# Patient Record
Sex: FEMALE | Race: OTHER | HISPANIC OR LATINO
[De-identification: names, ages, dates, MRNs, and addresses within clinical notes are randomized per-mention and may not be internally consistent; named-entity substitution may affect disease eponyms.]

---

## 2017-03-27 ENCOUNTER — APPOINTMENT (OUTPATIENT)
Dept: INTERNAL MEDICINE | Facility: CLINIC | Age: 42
End: 2017-03-27

## 2017-04-03 ENCOUNTER — OUTPATIENT (OUTPATIENT)
Dept: OUTPATIENT SERVICES | Facility: HOSPITAL | Age: 42
LOS: 1 days | End: 2017-04-03
Payer: SELF-PAY

## 2017-04-03 ENCOUNTER — APPOINTMENT (OUTPATIENT)
Dept: INTERNAL MEDICINE | Facility: CLINIC | Age: 42
End: 2017-04-03

## 2017-04-03 VITALS
HEART RATE: 78 BPM | WEIGHT: 137 LBS | BODY MASS INDEX: 25.89 KG/M2 | SYSTOLIC BLOOD PRESSURE: 122 MMHG | DIASTOLIC BLOOD PRESSURE: 80 MMHG

## 2017-04-03 DIAGNOSIS — M54.12 RADICULOPATHY, CERVICAL REGION: ICD-10-CM

## 2017-04-03 DIAGNOSIS — I10 ESSENTIAL (PRIMARY) HYPERTENSION: ICD-10-CM

## 2017-04-03 DIAGNOSIS — G47.9 SLEEP DISORDER, UNSPECIFIED: ICD-10-CM

## 2017-04-11 DIAGNOSIS — M54.12 RADICULOPATHY, CERVICAL REGION: ICD-10-CM

## 2017-04-11 DIAGNOSIS — E66.3 OVERWEIGHT: ICD-10-CM

## 2017-04-25 ENCOUNTER — LABORATORY RESULT (OUTPATIENT)
Age: 42
End: 2017-04-25

## 2017-04-25 PROCEDURE — G0463: CPT

## 2017-04-25 PROCEDURE — 84443 ASSAY THYROID STIM HORMONE: CPT

## 2017-04-26 LAB — TSH SERPL-MCNC: 3.04 UIU/ML — SIGNIFICANT CHANGE UP (ref 0.27–4.2)

## 2018-01-09 ENCOUNTER — MESSAGE (OUTPATIENT)
Age: 43
End: 2018-01-09

## 2018-01-18 ENCOUNTER — OUTPATIENT (OUTPATIENT)
Dept: OUTPATIENT SERVICES | Facility: HOSPITAL | Age: 43
LOS: 1 days | End: 2018-01-18
Payer: SELF-PAY

## 2018-01-18 ENCOUNTER — LABORATORY RESULT (OUTPATIENT)
Age: 43
End: 2018-01-18

## 2018-01-18 ENCOUNTER — APPOINTMENT (OUTPATIENT)
Dept: INTERNAL MEDICINE | Facility: CLINIC | Age: 43
End: 2018-01-18

## 2018-01-18 VITALS
SYSTOLIC BLOOD PRESSURE: 118 MMHG | OXYGEN SATURATION: 98 % | BODY MASS INDEX: 26.06 KG/M2 | HEIGHT: 61 IN | WEIGHT: 138 LBS | HEART RATE: 68 BPM | DIASTOLIC BLOOD PRESSURE: 72 MMHG

## 2018-01-18 DIAGNOSIS — M54.12 RADICULOPATHY, CERVICAL REGION: ICD-10-CM

## 2018-01-18 DIAGNOSIS — N63.0 UNSPECIFIED LUMP IN UNSPECIFIED BREAST: ICD-10-CM

## 2018-01-18 DIAGNOSIS — R07.9 CHEST PAIN, UNSPECIFIED: ICD-10-CM

## 2018-01-18 DIAGNOSIS — I10 ESSENTIAL (PRIMARY) HYPERTENSION: ICD-10-CM

## 2018-01-18 DIAGNOSIS — Z01.419 ENCOUNTER FOR GYNECOLOGICAL EXAMINATION (GENERAL) (ROUTINE) WITHOUT ABNORMAL FINDINGS: ICD-10-CM

## 2018-01-18 LAB
HCT VFR BLD CALC: 43.3 % — SIGNIFICANT CHANGE UP (ref 34.5–45)
HGB BLD-MCNC: 14.2 G/DL — SIGNIFICANT CHANGE UP (ref 11.5–15.5)
MCHC RBC-ENTMCNC: 30.1 PG — SIGNIFICANT CHANGE UP (ref 27–34)
MCHC RBC-ENTMCNC: 32.8 GM/DL — SIGNIFICANT CHANGE UP (ref 32–36)
MCV RBC AUTO: 91.7 FL — SIGNIFICANT CHANGE UP (ref 80–100)
PLATELET # BLD AUTO: 384 K/UL — SIGNIFICANT CHANGE UP (ref 150–400)
RBC # BLD: 4.72 M/UL — SIGNIFICANT CHANGE UP (ref 3.8–5.2)
RBC # FLD: 14.7 % — HIGH (ref 10.3–14.5)
WBC # BLD: 9.23 K/UL — SIGNIFICANT CHANGE UP (ref 3.8–10.5)
WBC # FLD AUTO: 9.23 K/UL — SIGNIFICANT CHANGE UP (ref 3.8–10.5)

## 2018-01-18 PROCEDURE — 82306 VITAMIN D 25 HYDROXY: CPT

## 2018-01-18 PROCEDURE — 93005 ELECTROCARDIOGRAM TRACING: CPT

## 2018-01-18 PROCEDURE — 90688 IIV4 VACCINE SPLT 0.5 ML IM: CPT

## 2018-01-18 PROCEDURE — 83036 HEMOGLOBIN GLYCOSYLATED A1C: CPT

## 2018-01-18 PROCEDURE — 84443 ASSAY THYROID STIM HORMONE: CPT

## 2018-01-18 PROCEDURE — G0008: CPT

## 2018-01-18 PROCEDURE — 80053 COMPREHEN METABOLIC PANEL: CPT

## 2018-01-18 PROCEDURE — 85027 COMPLETE CBC AUTOMATED: CPT

## 2018-01-18 PROCEDURE — G0463: CPT

## 2018-01-18 PROCEDURE — 80061 LIPID PANEL: CPT

## 2018-01-19 ENCOUNTER — MED ADMIN CHARGE (OUTPATIENT)
Age: 43
End: 2018-01-19

## 2018-01-19 DIAGNOSIS — E08.29 DIABETES MELLITUS DUE TO UNDERLYING CONDITION WITH OTHER DIABETIC KIDNEY COMPLICATION: ICD-10-CM

## 2018-01-19 DIAGNOSIS — E10.29 TYPE 1 DIABETES MELLITUS WITH OTHER DIABETIC KIDNEY COMPLICATION: ICD-10-CM

## 2018-01-19 DIAGNOSIS — Z79.4 DIABETES MELLITUS DUE TO UNDERLYING CONDITION WITH OTHER DIABETIC KIDNEY COMPLICATION: ICD-10-CM

## 2018-01-19 DIAGNOSIS — E10.628 TYPE 1 DIABETES MELLITUS WITH OTHER SKIN COMPLICATIONS: ICD-10-CM

## 2018-01-19 DIAGNOSIS — R80.9 DIABETES MELLITUS DUE TO UNDERLYING CONDITION WITH OTHER DIABETIC KIDNEY COMPLICATION: ICD-10-CM

## 2018-01-19 LAB
24R-OH-CALCIDIOL SERPL-MCNC: 19 NG/ML — LOW (ref 30–80)
ALBUMIN SERPL ELPH-MCNC: 4.4 G/DL — SIGNIFICANT CHANGE UP (ref 3.3–5)
ALP SERPL-CCNC: 107 U/L — SIGNIFICANT CHANGE UP (ref 40–120)
ALT FLD-CCNC: 99 U/L — HIGH (ref 10–45)
ANION GAP SERPL CALC-SCNC: 16 MMOL/L — SIGNIFICANT CHANGE UP (ref 5–17)
AST SERPL-CCNC: 60 U/L — HIGH (ref 10–40)
BILIRUB SERPL-MCNC: 0.3 MG/DL — SIGNIFICANT CHANGE UP (ref 0.2–1.2)
BUN SERPL-MCNC: 8 MG/DL — SIGNIFICANT CHANGE UP (ref 7–23)
CALCIUM SERPL-MCNC: 9.3 MG/DL — SIGNIFICANT CHANGE UP (ref 8.4–10.5)
CHLORIDE SERPL-SCNC: 100 MMOL/L — SIGNIFICANT CHANGE UP (ref 96–108)
CHOLEST SERPL-MCNC: 165 MG/DL — SIGNIFICANT CHANGE UP (ref 10–199)
CO2 SERPL-SCNC: 24 MMOL/L — SIGNIFICANT CHANGE UP (ref 22–31)
CREAT SERPL-MCNC: 0.52 MG/DL — SIGNIFICANT CHANGE UP (ref 0.5–1.3)
GLUCOSE SERPL-MCNC: 102 MG/DL — HIGH (ref 70–99)
HBA1C BLD-MCNC: 7 % — HIGH (ref 4–5.6)
HDLC SERPL-MCNC: 36 MG/DL — LOW (ref 40–125)
LIPID PNL WITH DIRECT LDL SERPL: 98 MG/DL — SIGNIFICANT CHANGE UP
POTASSIUM SERPL-MCNC: 4.2 MMOL/L — SIGNIFICANT CHANGE UP (ref 3.5–5.3)
POTASSIUM SERPL-SCNC: 4.2 MMOL/L — SIGNIFICANT CHANGE UP (ref 3.5–5.3)
PROT SERPL-MCNC: 7.8 G/DL — SIGNIFICANT CHANGE UP (ref 6–8.3)
SODIUM SERPL-SCNC: 140 MMOL/L — SIGNIFICANT CHANGE UP (ref 135–145)
TOTAL CHOLESTEROL/HDL RATIO MEASUREMENT: 4.6 RATIO — SIGNIFICANT CHANGE UP (ref 3.3–7.1)
TRIGL SERPL-MCNC: 153 MG/DL — HIGH (ref 10–149)
TSH SERPL-MCNC: 4.86 UIU/ML — HIGH (ref 0.27–4.2)

## 2018-01-24 DIAGNOSIS — Z23 ENCOUNTER FOR IMMUNIZATION: ICD-10-CM

## 2018-01-24 DIAGNOSIS — N63.0 UNSPECIFIED LUMP IN UNSPECIFIED BREAST: ICD-10-CM

## 2018-01-24 DIAGNOSIS — R07.9 CHEST PAIN, UNSPECIFIED: ICD-10-CM

## 2018-01-24 DIAGNOSIS — F32.9 MAJOR DEPRESSIVE DISORDER, SINGLE EPISODE, UNSPECIFIED: ICD-10-CM

## 2018-01-29 ENCOUNTER — FORM ENCOUNTER (OUTPATIENT)
Age: 43
End: 2018-01-29

## 2018-01-30 ENCOUNTER — APPOINTMENT (OUTPATIENT)
Dept: MAMMOGRAPHY | Facility: IMAGING CENTER | Age: 43
End: 2018-01-30
Payer: COMMERCIAL

## 2018-01-30 ENCOUNTER — LABORATORY RESULT (OUTPATIENT)
Age: 43
End: 2018-01-30

## 2018-01-30 ENCOUNTER — OUTPATIENT (OUTPATIENT)
Dept: OUTPATIENT SERVICES | Facility: HOSPITAL | Age: 43
LOS: 1 days | End: 2018-01-30
Payer: SELF-PAY

## 2018-01-30 ENCOUNTER — APPOINTMENT (OUTPATIENT)
Dept: ULTRASOUND IMAGING | Facility: IMAGING CENTER | Age: 43
End: 2018-01-30
Payer: COMMERCIAL

## 2018-01-30 DIAGNOSIS — Z23 ENCOUNTER FOR IMMUNIZATION: ICD-10-CM

## 2018-01-30 DIAGNOSIS — F32.9 MAJOR DEPRESSIVE DISORDER, SINGLE EPISODE, UNSPECIFIED: ICD-10-CM

## 2018-01-30 DIAGNOSIS — N63.0 UNSPECIFIED LUMP IN UNSPECIFIED BREAST: ICD-10-CM

## 2018-01-30 DIAGNOSIS — R07.9 CHEST PAIN, UNSPECIFIED: ICD-10-CM

## 2018-01-30 PROCEDURE — 77067 SCR MAMMO BI INCL CAD: CPT | Mod: 26

## 2018-01-30 PROCEDURE — 77067 SCR MAMMO BI INCL CAD: CPT

## 2018-01-30 PROCEDURE — 77063 BREAST TOMOSYNTHESIS BI: CPT | Mod: 26

## 2018-01-30 PROCEDURE — 77063 BREAST TOMOSYNTHESIS BI: CPT

## 2018-01-31 ENCOUNTER — OUTPATIENT (OUTPATIENT)
Dept: OUTPATIENT SERVICES | Facility: HOSPITAL | Age: 43
LOS: 1 days | End: 2018-01-31
Payer: SELF-PAY

## 2018-01-31 ENCOUNTER — APPOINTMENT (OUTPATIENT)
Dept: OBGYN | Facility: CLINIC | Age: 43
End: 2018-01-31
Payer: COMMERCIAL

## 2018-01-31 ENCOUNTER — LABORATORY RESULT (OUTPATIENT)
Age: 43
End: 2018-01-31

## 2018-01-31 VITALS
WEIGHT: 139.25 LBS | BODY MASS INDEX: 26.29 KG/M2 | HEIGHT: 61 IN | DIASTOLIC BLOOD PRESSURE: 100 MMHG | SYSTOLIC BLOOD PRESSURE: 148 MMHG

## 2018-01-31 DIAGNOSIS — N76.0 ACUTE VAGINITIS: ICD-10-CM

## 2018-01-31 DIAGNOSIS — Z01.419 ENCOUNTER FOR GYNECOLOGICAL EXAMINATION (GENERAL) (ROUTINE) W/OUT ABNORMAL FINDINGS: ICD-10-CM

## 2018-01-31 PROCEDURE — 99396 PREV VISIT EST AGE 40-64: CPT | Mod: NC

## 2018-02-01 LAB
C TRACH RRNA SPEC QL NAA+PROBE: SIGNIFICANT CHANGE UP
HCV AB S/CO SERPL IA: 0.06 S/CO — SIGNIFICANT CHANGE UP
HCV AB SERPL-IMP: SIGNIFICANT CHANGE UP
HIV 1+2 AB+HIV1 P24 AG SERPL QL IA: SIGNIFICANT CHANGE UP
HPV HIGH+LOW RISK DNA PNL CVX: SIGNIFICANT CHANGE UP
N GONORRHOEA RRNA SPEC QL NAA+PROBE: SIGNIFICANT CHANGE UP
SPECIMEN SOURCE: SIGNIFICANT CHANGE UP
T PALLIDUM AB TITR SER: NEGATIVE — SIGNIFICANT CHANGE UP

## 2018-02-01 PROCEDURE — 86803 HEPATITIS C AB TEST: CPT

## 2018-02-01 PROCEDURE — 88175 CYTOPATH C/V AUTO FLUID REDO: CPT

## 2018-02-01 PROCEDURE — G0463: CPT

## 2018-02-01 PROCEDURE — 86780 TREPONEMA PALLIDUM: CPT

## 2018-02-01 PROCEDURE — 87624 HPV HI-RISK TYP POOLED RSLT: CPT

## 2018-02-01 PROCEDURE — 87389 HIV-1 AG W/HIV-1&-2 AB AG IA: CPT

## 2018-02-03 LAB — CYTOLOGY SPEC DOC CYTO: SIGNIFICANT CHANGE UP

## 2018-02-08 DIAGNOSIS — Z01.419 ENCOUNTER FOR GYNECOLOGICAL EXAMINATION (GENERAL) (ROUTINE) WITHOUT ABNORMAL FINDINGS: ICD-10-CM

## 2018-05-25 ENCOUNTER — OTHER (OUTPATIENT)
Age: 43
End: 2018-05-25

## 2018-05-25 ENCOUNTER — EMERGENCY (EMERGENCY)
Facility: HOSPITAL | Age: 43
LOS: 1 days | Discharge: HOME | End: 2018-05-25
Attending: EMERGENCY MEDICINE
Payer: MEDICAID

## 2018-05-25 VITALS
DIASTOLIC BLOOD PRESSURE: 59 MMHG | HEART RATE: 76 BPM | TEMPERATURE: 98 F | HEIGHT: 59.06 IN | SYSTOLIC BLOOD PRESSURE: 103 MMHG | WEIGHT: 138.01 LBS | RESPIRATION RATE: 18 BRPM | OXYGEN SATURATION: 98 %

## 2018-05-25 PROCEDURE — 99283 EMERGENCY DEPT VISIT LOW MDM: CPT | Mod: 25

## 2018-05-25 PROCEDURE — 10060 I&D ABSCESS SIMPLE/SINGLE: CPT

## 2018-05-25 RX ORDER — AZTREONAM 2 G
2 VIAL (EA) INJECTION
Qty: 40 | Refills: 0 | OUTPATIENT
Start: 2018-05-25 | End: 2018-06-03

## 2018-05-25 RX ADMIN — Medication 2 TABLET(S): at 15:09

## 2018-05-25 NOTE — ED PROVIDER NOTE - ATTENDING CONTRIBUTION TO CARE
Abscess to L calf x8 days, unroofed with some drainage at home but still having pain, on exam patient with abscess to L medial calf with palpable underlying fluctuance and surrounding cellulitis - I&D, antibiotics, f/u in Emergency Department 2 days for wound check.

## 2018-05-25 NOTE — ED PROVIDER NOTE - CROS ED CARDIOVAS ALL NEG
----- Message from Vee Rose sent at 3/2/2017  1:36 PM CST -----  Regarding: Relpax refill  Contact: [phone]  Hi Dr. Casas,    I tried to fill my prescription at [facility] with the discount card, however, it was still too much money.  Could you please send refill to [facility].      Thanks!  Have a good day!  Vee Rose  
[facility] to [facility]. Patient notified via [employer]  
negative...

## 2018-05-25 NOTE — ED PROVIDER NOTE - OBJECTIVE STATEMENT
41yo female p/w right lower extremity abscess for 8 days. Pt. reports pain to the site, seen at outside clinic and referred to ED. Denies fevers, weakness, trauma.

## 2018-05-25 NOTE — ED PROVIDER NOTE - SKIN WOUND DESCRIPTION
right anterior lower extremity(overlying mid tibia) 2x2cm fluctulance, mild serous drainage; right 2nd digit peronychia, normal strngth and sensation and range of motion

## 2018-05-25 NOTE — ED PROVIDER NOTE - CARE PLAN
Principal Discharge DX:	Abscess  Assessment and plan of treatment:	You were seen in the Emergency Department for an abscess. It was drained. Please follow up with your regular physician this week for reevaluation. Take the antibiotics as written. Please return to the Emergency Department if you have any new concerning symptoms such as severe pain, weakness, fevers or any other concerning symptoms.

## 2018-05-25 NOTE — ED PROVIDER NOTE - PLAN OF CARE
You were seen in the Emergency Department for an abscess. It was drained. Please follow up with your regular physician this week for reevaluation. Take the antibiotics as written. Please return to the Emergency Department if you have any new concerning symptoms such as severe pain, weakness, fevers or any other concerning symptoms.

## 2018-05-29 ENCOUNTER — OUTPATIENT (OUTPATIENT)
Dept: OUTPATIENT SERVICES | Facility: HOSPITAL | Age: 43
LOS: 1 days | End: 2018-05-29
Payer: SELF-PAY

## 2018-05-29 ENCOUNTER — APPOINTMENT (OUTPATIENT)
Dept: INTERNAL MEDICINE | Facility: CLINIC | Age: 43
End: 2018-05-29

## 2018-05-29 VITALS
HEIGHT: 61 IN | SYSTOLIC BLOOD PRESSURE: 102 MMHG | WEIGHT: 135 LBS | OXYGEN SATURATION: 98 % | DIASTOLIC BLOOD PRESSURE: 60 MMHG | BODY MASS INDEX: 25.49 KG/M2 | HEART RATE: 80 BPM | TEMPERATURE: 98.6 F

## 2018-05-29 VITALS — SYSTOLIC BLOOD PRESSURE: 110 MMHG | DIASTOLIC BLOOD PRESSURE: 70 MMHG

## 2018-05-29 DIAGNOSIS — R74.8 ABNORMAL LEVELS OF OTHER SERUM ENZYMES: ICD-10-CM

## 2018-05-29 DIAGNOSIS — I10 ESSENTIAL (PRIMARY) HYPERTENSION: ICD-10-CM

## 2018-05-29 DIAGNOSIS — L02.419 CUTANEOUS ABSCESS OF LIMB, UNSPECIFIED: ICD-10-CM

## 2018-05-29 PROCEDURE — G0463: CPT

## 2018-05-30 LAB
ALBUMIN SERPL ELPH-MCNC: 4.5 G/DL
ALP BLD-CCNC: 104 U/L
ALT SERPL-CCNC: 40 U/L
ANION GAP SERPL CALC-SCNC: 15 MMOL/L
AST SERPL-CCNC: 29 U/L
BASOPHILS # BLD AUTO: 0.04 K/UL
BASOPHILS NFR BLD AUTO: 0.5 %
BILIRUB SERPL-MCNC: 0.2 MG/DL
BUN SERPL-MCNC: 8 MG/DL
CALCIUM SERPL-MCNC: 9.6 MG/DL
CHLORIDE SERPL-SCNC: 102 MMOL/L
CO2 SERPL-SCNC: 23 MMOL/L
CREAT SERPL-MCNC: 0.58 MG/DL
EOSINOPHIL # BLD AUTO: 0.12 K/UL
EOSINOPHIL NFR BLD AUTO: 1.4 %
GLUCOSE SERPL-MCNC: 87 MG/DL
HBA1C MFR BLD HPLC: 7.1 %
HCT VFR BLD CALC: 43.3 %
HGB BLD-MCNC: 14.3 G/DL
IMM GRANULOCYTES NFR BLD AUTO: 0.5 %
LYMPHOCYTES # BLD AUTO: 2.45 K/UL
LYMPHOCYTES NFR BLD AUTO: 28.9 %
MAN DIFF?: NORMAL
MCHC RBC-ENTMCNC: 28.9 PG
MCHC RBC-ENTMCNC: 33 GM/DL
MCV RBC AUTO: 87.7 FL
MONOCYTES # BLD AUTO: 0.59 K/UL
MONOCYTES NFR BLD AUTO: 7 %
NEUTROPHILS # BLD AUTO: 5.24 K/UL
NEUTROPHILS NFR BLD AUTO: 61.7 %
PLATELET # BLD AUTO: 377 K/UL
POTASSIUM SERPL-SCNC: 4.4 MMOL/L
PROT SERPL-MCNC: 8 G/DL
RBC # BLD: 4.94 M/UL
RBC # FLD: 14.2 %
SODIUM SERPL-SCNC: 140 MMOL/L
TSH SERPL-ACNC: 3.92 UIU/ML
WBC # FLD AUTO: 8.48 K/UL

## 2018-05-30 NOTE — PLAN
[FreeTextEntry1] : #R Leg Abscess s/p I&D, clinically non-toxic appearing and hypotension possibly component of dehydration or baseline low BP rather than sepsis\par -Given Bactrim DS BID for 10 days (5/25-6/3) during a recent [facility] ED visit (5/25)\par -c/w Bactrim and f/u Cr but currently no c/o SE\par -advised to go back to the ED if she notices her leg getting worse with redness, starts having fever, notices having dizziness, CP, HA\par \par #DM, A1c 7.0% at 1/2018, currently asymptomatic and stable\par -c/w Metformin 500mg BID\par -f/u A1c\par \par #Hypothyroidism, currently asymptomatic and stable\par -c/w Levothyroxine 88mcg qd\par -f/u TSH w/ FT4\par \par #Transaminitis 2/2 hepatic steatosis seen on 5/2015\par -f/u LFTs\par -considering repeat RUQ US\par \par #HCM\par -c/w VIt D 2000U qd\par -UTD on pap and cultures, wnl as per annual GYN 1/2018\par \par Case d/w Dr. Steward\par \par RTC in 1 year for annual CPE or earlier if worsening R leg abscess

## 2018-05-30 NOTE — HISTORY OF PRESENT ILLNESS
[FreeTextEntry1] : Post ED visit after having an abscess on the leg drained and treated with antibiotics.  [de-identified] : 43 y/o F PMH DM, Hypothyroidism, GERD, hx of benign breast mass, depression presents for f/u visit after going to the ED on 5/25 for R leg abscess\par \par #R Leg abscess, started as a pimple 2 mons and then got worse 2 weeks ago, started to enlarge and a/w chills/malaise and boil popped, advised to go to the [facility] ED 5/25. Denies fever, trauma, insect bites weakness\par I&D of 2x2 cm erythematous fluctuant R LE abscess and given Bactrim DS BID for 10 days (5/25-6/3) and had started to get better and smaller and it is now not painful and hasn't had any issues taking the medications. No fevers, nausea, vomiting, diarrhea, sick contact\par  \par #DM, A1c 7/0% at 1/2018, still able to take the metformin 500mg BID and w/ no issues of polyuria, polydipsia, nocturia\par \par #Hypothyroidism, pt has been on Synthroid 88mcg .Denied  any recent weight loss, weight gain, cold/heat intolerance, dry skin, hair loss.

## 2018-05-30 NOTE — REVIEW OF SYSTEMS
[Skin Lesions] : skin lesion [Skin Wound] : skin wound [Fever] : no fever [Chills] : no chills [Feeling Poorly] : not feeling poorly [Feeling Tired] : not feeling tired [Recent Weight Gain (___ Lbs)] : no recent weight gain [Recent Weight Loss (___ Lbs)] : no recent weight loss [Heart Rate Is Slow] : the heart rate was not slow [Heart Rate Is Fast] : the heart rate was not fast [Chest Pain] : no chest pain [Shortness Of Breath] : no shortness of breath [Cough] : no cough [Abdominal Pain] : no abdominal pain [Constipation] : no constipation [Diarrhea] : no diarrhea [Dysuria] : no dysuria [Confused] : no confusion [Dizziness] : no dizziness [Anxiety] : no anxiety [Depression] : no depression

## 2018-05-30 NOTE — PHYSICAL EXAM
[General Appearance - Alert] : alert [General Appearance - In No Acute Distress] : in no acute distress [General Appearance - Well Nourished] : well nourished [General Appearance - Well Developed] : well developed [General Appearance - Well-Appearing] : healthy appearing [Sclera] : the sclera and conjunctiva were normal [PERRL With Normal Accommodation] : pupils were equal in size, round, and reactive to light [Extraocular Movements] : extraocular movements were intact [Outer Ear] : the ears and nose were normal in appearance [Hearing Threshold Finger Rub Not [place]] : hearing was normal [Examination Of The Oral Cavity] : the lips and gums were normal [Nasal Cavity] : the nasal mucosa and septum were normal [Oropharynx] : the oropharynx was normal [Neck Appearance] : the appearance of the neck was normal [Neck Cervical Mass (___cm)] : no neck mass was observed [Jugular Venous Distention Increased] : there was no jugular-venous distention [Thyroid Diffuse Enlargement] : the thyroid was not enlarged [Thyroid Nodule] : there were no palpable thyroid nodules [Respiration, Rhythm And Depth] : normal respiratory rhythm and effort [Exaggerated Use Of Accessory Muscles For Inspiration] : no accessory muscle use [Auscultation Breath Sounds / Voice Sounds] : lungs were clear to auscultation bilaterally [Heart Rate And Rhythm] : heart rate was normal and rhythm regular [Heart Sounds] : normal S1 and S2 [Heart Sounds Gallop] : no gallops [Murmurs] : no murmurs [Heart Sounds Pericardial Friction Rub] : no pericardial rub [Arterial Pulses Carotid] : carotid pulses were normal with no bruits [Bowel Sounds] : normal bowel sounds [Abdomen Soft] : soft [Abdomen Tenderness] : non-tender [Abdomen Mass (___ Cm)] : no abdominal mass palpated [Abdomen Hernia] : no hernia was discovered [Cervical Lymph Nodes Enlarged Posterior Bilaterally] : posterior cervical [Cervical Lymph Nodes Enlarged Anterior Bilaterally] : anterior cervical [Abnormal Walk] : normal gait [Nail Clubbing] : no clubbing  or cyanosis of the fingernails [Involuntary Movements] : no involuntary movements were seen [Musculoskeletal - Swelling] : no joint swelling seen [Motor Tone] : muscle strength and tone were normal [Skin Color & Pigmentation] : normal skin color and pigmentation [Skin Turgor] : normal skin turgor [] : no rash [Skin Lesions] : no skin lesions [Cranial Nerves] : cranial nerves 2-12 were intact [Sensation] : the sensory exam was normal to light touch and pinprick [Motor Exam] : the motor exam was normal [No Focal Deficits] : no focal deficits [Oriented To Time, Place, And Person] : oriented to person, place, and time [Impaired Insight] : insight and judgment were intact [FreeTextEntry1] : Non-toxic appearing [Over the Past 2 Weeks, Have You Felt Down, Depressed, or Hopeless?] : 1.) Over the past 2 weeks, have you felt down, depressed, or hopeless? No [Over the Past 2 Weeks, Have You Felt Little Interest or Pleasure Doing Things?] : 2.) Over the past 2 weeks, have you felt little interest or pleasure doing things? No

## 2018-05-30 NOTE — HEALTH RISK ASSESSMENT
[Intercurrent ED visits] : went to ED [No falls in past year] : Patient reported no falls in the past year [0] : 2) Feeling down, depressed, or hopeless: Not at all (0) [] : No [de-identified] : 5/25/18

## 2018-05-30 NOTE — ASSESSMENT
[FreeTextEntry1] : 41 y/o F PMH DM, Hypothyroidism, GERD, hx of benign breast mass, depression presents for f/u visit after going to the ED on 5/25 for R leg abscess

## 2018-05-31 DIAGNOSIS — L02.419 CUTANEOUS ABSCESS OF LIMB, UNSPECIFIED: ICD-10-CM

## 2018-05-31 DIAGNOSIS — E11.49 TYPE 2 DIABETES MELLITUS WITH OTHER DIABETIC NEUROLOGICAL COMPLICATION: ICD-10-CM

## 2018-05-31 DIAGNOSIS — E03.8 OTHER SPECIFIED HYPOTHYROIDISM: ICD-10-CM

## 2018-05-31 DIAGNOSIS — Z79.4 LONG TERM (CURRENT) USE OF INSULIN: ICD-10-CM

## 2018-05-31 DIAGNOSIS — R74.8 ABNORMAL LEVELS OF OTHER SERUM ENZYMES: ICD-10-CM

## 2018-05-31 DIAGNOSIS — E06.3 AUTOIMMUNE THYROIDITIS: ICD-10-CM

## 2018-08-08 NOTE — ED ADULT NURSE NOTE - NS ED NURSE LEVEL OF CONSCIOUSNESS AFFECT
This is a recent snapshot of the patient's [facility] medical record.  For current drug dose and complete information and questions, call [phone] or In Basket pool, [facility] home infusion ([id number])  CSN Number:  [id number]       Calm

## 2019-03-06 ENCOUNTER — EMERGENCY (EMERGENCY)
Facility: HOSPITAL | Age: 44
LOS: 1 days | Discharge: HOME | End: 2019-03-06
Attending: EMERGENCY MEDICINE
Payer: SELF-PAY

## 2019-03-06 VITALS
DIASTOLIC BLOOD PRESSURE: 98 MMHG | HEART RATE: 71 BPM | SYSTOLIC BLOOD PRESSURE: 141 MMHG | OXYGEN SATURATION: 100 % | RESPIRATION RATE: 18 BRPM | TEMPERATURE: 98 F

## 2019-03-06 VITALS
WEIGHT: 134.92 LBS | TEMPERATURE: 98 F | DIASTOLIC BLOOD PRESSURE: 111 MMHG | SYSTOLIC BLOOD PRESSURE: 171 MMHG | RESPIRATION RATE: 16 BRPM | HEIGHT: 59.06 IN | OXYGEN SATURATION: 99 % | HEART RATE: 88 BPM

## 2019-03-06 PROCEDURE — 99284 EMERGENCY DEPT VISIT MOD MDM: CPT

## 2019-03-06 PROCEDURE — 90715 TDAP VACCINE 7 YRS/> IM: CPT

## 2019-03-06 PROCEDURE — 90471 IMMUNIZATION ADMIN: CPT

## 2019-03-06 PROCEDURE — 99284 EMERGENCY DEPT VISIT MOD MDM: CPT | Mod: 25

## 2019-03-06 RX ORDER — TETANUS TOXOID, REDUCED DIPHTHERIA TOXOID AND ACELLULAR PERTUSSIS VACCINE, ADSORBED 5; 2.5; 8; 8; 2.5 [IU]/.5ML; [IU]/.5ML; UG/.5ML; UG/.5ML; UG/.5ML
0.5 SUSPENSION INTRAMUSCULAR ONCE
Qty: 0 | Refills: 0 | Status: COMPLETED | OUTPATIENT
Start: 2019-03-06 | End: 2019-03-06

## 2019-03-06 RX ORDER — IBUPROFEN 200 MG
600 TABLET ORAL ONCE
Qty: 0 | Refills: 0 | Status: COMPLETED | OUTPATIENT
Start: 2019-03-06 | End: 2019-03-06

## 2019-03-06 RX ADMIN — Medication 600 MILLIGRAM(S): at 16:27

## 2019-03-06 RX ADMIN — TETANUS TOXOID, REDUCED DIPHTHERIA TOXOID AND ACELLULAR PERTUSSIS VACCINE, ADSORBED 0.5 MILLILITER(S): 5; 2.5; 8; 8; 2.5 SUSPENSION INTRAMUSCULAR at 16:28

## 2019-03-06 RX ADMIN — Medication 1 TABLET(S): at 16:28

## 2019-03-06 NOTE — ED ADULT NURSE NOTE - NS ED NURSE DC INFO COMPLEXITY
Moderate: Comprehensive teaching/Simple: Patient demonstrates quick and easy understanding/Returned Demonstration/Verbalized Understanding

## 2019-03-06 NOTE — ED PROVIDER NOTE - CLINICAL SUMMARY MEDICAL DECISION MAKING FREE TEXT BOX
s/p dog bite today-  superficial - no risk for fb, tdap and augmentin given- wounds  irriagated s/p dog bite today-  superficial - no risk for fb, tdap and augmentin given- wounds  irriagated  Attending Statement: Agree with the above.  Does not require wound closure.  Irrigation and tdap, ppx abx.  Vaccinated dog.  D/C c strict precautions.  --BMM

## 2019-03-06 NOTE — ED PROVIDER NOTE - OBJECTIVE STATEMENT
44 yo male presents to the ER for evaluation of dog bite to right lower leg and left upper thigh. Pt states "I am a  and their dog Walker  bit me as I was walking up the stairs. The owner of the dog , my boss told me the dog is up to date on his immunizations. I put a bandaid on the wounds and I went home. I am concerned about it getting infected". Tdap status unknown.  Pt reports mild pain to bite sites which are both superficial puncture/abraisons.

## 2019-03-06 NOTE — ED ADULT NURSE NOTE - NSIMPLEMENTINTERV_GEN_ALL_ED
Implemented All Universal Safety Interventions:  [place] to call system. Call bell, personal items and telephone within reach. Instruct patient to call for assistance. Room bathroom lighting operational. Non-slip footwear when patient is off stretcher. Physically safe environment: no spills, clutter or unnecessary equipment. Stretcher in lowest position, wheels locked, appropriate side rails in place.

## 2019-03-06 NOTE — ED ADULT TRIAGE NOTE - CHIEF COMPLAINT QUOTE
was bit by a small dog in left upper leg and right lower leg  she asked owners if dog was vaccinated and owners say yes

## 2019-03-06 NOTE — ED PROVIDER NOTE - CARE PLAN
Principal Discharge DX:	Dog bite, initial encounter Principal Discharge DX:	Dog bite, initial encounter  Goal:	B/L LE, upper

## 2019-03-06 NOTE — ED PROVIDER NOTE - PHYSICAL EXAMINATION
right lower extremity with 3x3 cm dog bite, superficial puncture wound with abrasion, no bleeding to site, with some eecchymosis- tender to palpation  Left upper thigh with 2x3 cm  dog bite also superficial puncture wound with abrasion,. no bleeding to site  with some ecchymosis- tender to palpation

## 2019-03-06 NOTE — ED PROVIDER NOTE - NSFOLLOWUPINSTRUCTIONS_ED_ALL_ED_FT
-- Please use 650mg Tylenol (also called acetaminophen) every 4 hours & 600mg Motrin (also called Advil or ibuprofen) every 6 hours as needed for pain/discomfort/swelling. You can get these without a prescription. Don't use more than 3500mg of Tylenol in any 24-hour period. Make sure your other prescription/over-the-counter medications don't contain any Tylenol so you don't take too much. If you have any stomach discomfort while taking Motrin, you can use TUMS or Pepcid or Zantac (these can all be bought without a prescription).     clean wounds daily- inspect for sign or symptoms such as  fever >101, chills, pus, spreading redness or any other concern  Return to the ER for any of the above

## 2019-03-07 NOTE — DISCUSSION/SUMMARY
[FreeTextEntry1] : Pt visited [facility] ED on 3/6 after being bitten by her employers dog in the R lower leg and L upper thigh.  Pt was discharged on a 5 day course of BID augmentin and advised to take Tylenol or advil for pain.

## 2019-08-07 ENCOUNTER — OUTPATIENT (OUTPATIENT)
Dept: OUTPATIENT SERVICES | Facility: HOSPITAL | Age: 44
LOS: 1 days | End: 2019-08-07
Payer: SELF-PAY

## 2019-08-07 ENCOUNTER — LABORATORY RESULT (OUTPATIENT)
Age: 44
End: 2019-08-07

## 2019-08-07 ENCOUNTER — APPOINTMENT (OUTPATIENT)
Dept: INTERNAL MEDICINE | Facility: CLINIC | Age: 44
End: 2019-08-07

## 2019-08-07 VITALS
HEIGHT: 61 IN | BODY MASS INDEX: 24.55 KG/M2 | DIASTOLIC BLOOD PRESSURE: 70 MMHG | WEIGHT: 130 LBS | SYSTOLIC BLOOD PRESSURE: 112 MMHG

## 2019-08-07 DIAGNOSIS — M25.559 PAIN IN UNSPECIFIED HIP: ICD-10-CM

## 2019-08-07 DIAGNOSIS — I10 ESSENTIAL (PRIMARY) HYPERTENSION: ICD-10-CM

## 2019-08-07 DIAGNOSIS — Z79.4 TYPE 2 DIABETES MELLITUS WITH OTHER DIABETIC NEUROLOGICAL COMPLICATION: ICD-10-CM

## 2019-08-07 DIAGNOSIS — E11.49 TYPE 2 DIABETES MELLITUS WITH OTHER DIABETIC NEUROLOGICAL COMPLICATION: ICD-10-CM

## 2019-08-07 DIAGNOSIS — R51 HEADACHE: ICD-10-CM

## 2019-08-07 LAB
ALBUMIN SERPL ELPH-MCNC: 4.5 G/DL — SIGNIFICANT CHANGE UP (ref 3.3–5)
ALP SERPL-CCNC: 107 U/L — SIGNIFICANT CHANGE UP (ref 40–120)
ALT FLD-CCNC: 150 U/L — HIGH (ref 10–45)
ANION GAP SERPL CALC-SCNC: 12 MMOL/L — SIGNIFICANT CHANGE UP (ref 5–17)
AST SERPL-CCNC: 154 U/L — HIGH (ref 10–40)
BILIRUB SERPL-MCNC: 0.3 MG/DL — SIGNIFICANT CHANGE UP (ref 0.2–1.2)
BUN SERPL-MCNC: 7 MG/DL — SIGNIFICANT CHANGE UP (ref 7–23)
CALCIUM SERPL-MCNC: 9.5 MG/DL — SIGNIFICANT CHANGE UP (ref 8.4–10.5)
CHLORIDE SERPL-SCNC: 104 MMOL/L — SIGNIFICANT CHANGE UP (ref 96–108)
CO2 SERPL-SCNC: 23 MMOL/L — SIGNIFICANT CHANGE UP (ref 22–31)
CREAT SERPL-MCNC: 0.5 MG/DL — SIGNIFICANT CHANGE UP (ref 0.5–1.3)
GLUCOSE SERPL-MCNC: 145 MG/DL — HIGH (ref 70–99)
HCT VFR BLD CALC: 45.3 % — HIGH (ref 34.5–45)
HGB BLD-MCNC: 14.3 G/DL — SIGNIFICANT CHANGE UP (ref 11.5–15.5)
MCHC RBC-ENTMCNC: 29.4 PG — SIGNIFICANT CHANGE UP (ref 27–34)
MCHC RBC-ENTMCNC: 31.6 GM/DL — LOW (ref 32–36)
MCV RBC AUTO: 93.2 FL — SIGNIFICANT CHANGE UP (ref 80–100)
PLATELET # BLD AUTO: 373 K/UL — SIGNIFICANT CHANGE UP (ref 150–400)
POTASSIUM SERPL-MCNC: 4.4 MMOL/L — SIGNIFICANT CHANGE UP (ref 3.5–5.3)
POTASSIUM SERPL-SCNC: 4.4 MMOL/L — SIGNIFICANT CHANGE UP (ref 3.5–5.3)
PROT SERPL-MCNC: 7.5 G/DL — SIGNIFICANT CHANGE UP (ref 6–8.3)
RBC # BLD: 4.86 M/UL — SIGNIFICANT CHANGE UP (ref 3.8–5.2)
RBC # FLD: 15.1 % — HIGH (ref 10.3–14.5)
SODIUM SERPL-SCNC: 139 MMOL/L — SIGNIFICANT CHANGE UP (ref 135–145)
WBC # BLD: 7.31 K/UL — SIGNIFICANT CHANGE UP (ref 3.8–10.5)
WBC # FLD AUTO: 7.31 K/UL — SIGNIFICANT CHANGE UP (ref 3.8–10.5)

## 2019-08-07 PROCEDURE — 83036 HEMOGLOBIN GLYCOSYLATED A1C: CPT

## 2019-08-07 PROCEDURE — 80053 COMPREHEN METABOLIC PANEL: CPT

## 2019-08-07 PROCEDURE — 82043 UR ALBUMIN QUANTITATIVE: CPT

## 2019-08-07 PROCEDURE — 36415 COLL VENOUS BLD VENIPUNCTURE: CPT

## 2019-08-07 PROCEDURE — G0463: CPT

## 2019-08-07 PROCEDURE — 84443 ASSAY THYROID STIM HORMONE: CPT

## 2019-08-07 PROCEDURE — 85027 COMPLETE CBC AUTOMATED: CPT

## 2019-08-07 PROCEDURE — 84439 ASSAY OF FREE THYROXINE: CPT

## 2019-08-07 NOTE — PHYSICAL EXAM
[No Acute Distress] : no acute distress [Normal Sclera/Conjunctiva] : normal sclera/conjunctiva [PERRL] : pupils equal round and reactive to light [Normal Oropharynx] : the oropharynx was normal [No Lymphadenopathy] : no lymphadenopathy [No Accessory Muscle Use] : no accessory muscle use [No Respiratory Distress] : no respiratory distress  [Normal Rate] : normal rate  [Clear to Auscultation] : lungs were clear to auscultation bilaterally [Normal S1, S2] : normal S1 and S2 [Regular Rhythm] : with a regular rhythm [Pedal Pulses Present] : the pedal pulses are present [No Edema] : there was no peripheral edema [Non Tender] : non-tender [Soft] : abdomen soft [No Joint Swelling] : no joint swelling [No Spinal Tenderness] : no spinal tenderness [Coordination Grossly Intact] : coordination grossly intact [No Focal Deficits] : no focal deficits [Normal Gait] : normal gait [de-identified] : negative TMJ, no sinus tenderness [de-identified] : No papilledema noted [de-identified] : tenderness to palpation of the left hip joint and surrounding muscle/bursa [de-identified] : 5/5 in both upper and lower extremity

## 2019-08-07 NOTE — REVIEW OF SYSTEMS
[Fatigue] : fatigue [Vision Problems] : vision problems [Nausea] : nausea [Headache] : headache [Fever] : no fever [Chills] : no chills [Discharge] : no discharge [Pain] : no pain [Earache] : no earache [Hearing Loss] : no hearing loss [Chest Pain] : no chest pain [Palpitations] : no palpitations [Lower Ext Edema] : no lower extremity edema [Orthopnea] : no orthopnea [Shortness Of Breath] : no shortness of breath [Wheezing] : no wheezing [Cough] : no cough [Dyspnea on Exertion] : no dyspnea on exertion [Abdominal Pain] : no abdominal pain [Constipation] : no constipation [Diarrhea] : diarrhea [Vomiting] : no vomiting [Dysuria] : no dysuria [Joint Pain] : no joint pain [Itching] : no itching [Dizziness] : no dizziness [Fainting] : no fainting [Easy Bleeding] : no easy bleeding [FreeTextEntry9] : left hip pain

## 2019-08-07 NOTE — HISTORY OF PRESENT ILLNESS
[FreeTextEntry8] : 43 y/o F PMH DM, Hypothyroidism, GERD, hx of benign breast mass, depression presents for acute visit.  ID: [id number].\par \par 1. Headache: Reports headache x 15 days, intermittent, last about 5-10 minutes initially, now lasts up to 8-12 hours. Associated with sex and orgasm.  exercise (running) or lifting things. Located in the left temporal/parietal region. described the pain as sharp and pulsating. The pain also wakes the pt up at night. No photophobia. Had 1 episode of nausea, no emesis. Took Tylenol without relief. Pain is not associated with particular time of the day. Reports foggy vision and sometimes double visions x2 months.\par \par No new medications/diet changes recently.\par \par Had h/o strong headache x 2 episodes diffusely in 2017. Mom had headache and was dx with diabetes. \par \par 2. DM and Hypothyroidism: prescribed metformin and levothyroxine in the past, did not take the medications in 1 year. Does not check sugar level at home. Reports some fatigue, weakness and generalized not feeling well. Worsening visions/foggy over the past few months. Denies numbness/tingling in extremities.\par \par With regard to hypothyroidism, denies cold intolerance or changes in skin/nail. Noticed some constipation and some heat intolerance. \par \par 3. Hip pain: Reports left sided hip pain radiating down the lateral thigh x 4 days. Pain worsen when attempts to internally rotate the hip. Pain improves with Tylenol. Denies trauma to the area.\par

## 2019-08-07 NOTE — ASSESSMENT
[FreeTextEntry1] : 41 y/o F PMH DM, Hypothyroidism, GERD, hx of benign breast mass, depression presents for acute visit.\par \par Headache: given acuity and nature of the headache, need to r/o for increased intracranial pressure\par - CT scan w/wo con of head ordered\par - Neurology referral given\par \par Left hip pain: likely bursitis\par - c/w Tylenol for now\par - If not improving - PT referral next appointment\par \par HCM:\par - check A1C, urine micro albumin/cr-, CBC, TSH and CMP\par - metformin and levothyroxine renewed to pharmacy on file\par - followup in 5 weeks for DM & headache followup, may need Ophth referral \par \par RTO in 5 weeks for followup

## 2019-08-07 NOTE — PLAN
[FreeTextEntry1] : Pain with cough, valsalva, intercourse. Pain awakens her ALL RED flags for increase ICP

## 2019-08-08 DIAGNOSIS — M25.559 PAIN IN UNSPECIFIED HIP: ICD-10-CM

## 2019-08-08 DIAGNOSIS — E11.49 TYPE 2 DIABETES MELLITUS WITH OTHER DIABETIC NEUROLOGICAL COMPLICATION: ICD-10-CM

## 2019-08-08 DIAGNOSIS — E11.9 TYPE 2 DIABETES MELLITUS WITHOUT COMPLICATIONS: ICD-10-CM

## 2019-08-08 DIAGNOSIS — R51 HEADACHE: ICD-10-CM

## 2019-08-08 DIAGNOSIS — E03.8 OTHER SPECIFIED HYPOTHYROIDISM: ICD-10-CM

## 2019-08-08 LAB
CREAT ?TM UR-MCNC: 44 MG/DL — SIGNIFICANT CHANGE UP
ESTIMATED AVERAGE GLUCOSE: 192 MG/DL — HIGH (ref 68–114)
HBA1C BLD-MCNC: 8.3 % — HIGH (ref 4–5.6)
MICROALBUMIN UR-MCNC: <1.2 MG/DL — SIGNIFICANT CHANGE UP
MICROALBUMIN/CREAT UR-RTO: SIGNIFICANT CHANGE UP MG/G (ref 0–30)
T4 FREE SERPL-MCNC: 0.8 NG/DL — LOW (ref 0.9–1.8)
T4 FREE+ TSH PNL SERPL: 7.75 UIU/ML — HIGH (ref 0.27–4.2)

## 2020-01-14 ENCOUNTER — LABORATORY RESULT (OUTPATIENT)
Age: 45
End: 2020-01-14

## 2020-01-15 ENCOUNTER — OUTPATIENT (OUTPATIENT)
Dept: OUTPATIENT SERVICES | Facility: HOSPITAL | Age: 45
LOS: 1 days | End: 2020-01-15
Payer: SELF-PAY

## 2020-01-15 ENCOUNTER — APPOINTMENT (OUTPATIENT)
Dept: OBGYN | Facility: CLINIC | Age: 45
End: 2020-01-15
Payer: COMMERCIAL

## 2020-01-15 VITALS — BODY MASS INDEX: 25.7 KG/M2 | SYSTOLIC BLOOD PRESSURE: 114 MMHG | DIASTOLIC BLOOD PRESSURE: 70 MMHG | WEIGHT: 136 LBS

## 2020-01-15 DIAGNOSIS — N76.0 ACUTE VAGINITIS: ICD-10-CM

## 2020-01-15 DIAGNOSIS — N90.89 OTHER SPECIFIED NONINFLAMMATORY DISORDERS OF VULVA AND PERINEUM: ICD-10-CM

## 2020-01-15 PROCEDURE — 99214 OFFICE O/P EST MOD 30 MIN: CPT | Mod: NC

## 2020-01-15 PROCEDURE — G0463: CPT

## 2020-01-21 NOTE — END OF VISIT
[] : Resident [FreeTextEntry3] : I was present for procedure Vulvar 4 mm punch biopsy one on the left and one on the right

## 2020-01-21 NOTE — PROCEDURE
[Vulvar Biopsy] : Vulvar Biopsy [Sent to Histology] : the specimen was place in buffered formalin and sent for pathlogy [] : on the left labia minora [Silver Nitrate] : silver nitrate [Tolerated Well] : the patient tolerated the procedure well [Punch] : punch biopsy [No Complications] : there were no complications [Size of Biopsy Taken: ___ (mm)] : [unfilled]Umm [Pressure] : the application of direct pressure [de-identified] : right and left labial lesions

## 2020-01-24 DIAGNOSIS — N90.89 OTHER SPECIFIED NONINFLAMMATORY DISORDERS OF VULVA AND PERINEUM: ICD-10-CM

## 2020-01-29 ENCOUNTER — APPOINTMENT (OUTPATIENT)
Dept: OBGYN | Facility: CLINIC | Age: 45
End: 2020-01-29
Payer: COMMERCIAL

## 2020-01-29 ENCOUNTER — OUTPATIENT (OUTPATIENT)
Dept: OUTPATIENT SERVICES | Facility: HOSPITAL | Age: 45
LOS: 1 days | End: 2020-01-29
Payer: SELF-PAY

## 2020-01-29 VITALS — BODY MASS INDEX: 25.51 KG/M2 | DIASTOLIC BLOOD PRESSURE: 80 MMHG | WEIGHT: 135 LBS | SYSTOLIC BLOOD PRESSURE: 120 MMHG

## 2020-01-29 DIAGNOSIS — N76.0 ACUTE VAGINITIS: ICD-10-CM

## 2020-01-29 DIAGNOSIS — E11.9 TYPE 2 DIABETES MELLITUS WITHOUT COMPLICATIONS: ICD-10-CM

## 2020-01-29 PROCEDURE — 99213 OFFICE O/P EST LOW 20 MIN: CPT | Mod: GC

## 2020-01-29 PROCEDURE — G0463: CPT

## 2020-02-03 NOTE — BEGINNING OF VISIT
[Patient] : patient [] :  [[employer] ] : [employer]   [FreeTextEntry2] : Adrien [TWNoteComboBox1] : [place] [FreeTextEntry1] : [id number]

## 2020-02-03 NOTE — PHYSICAL EXAM
[Normal] : urethra [Labia Majora] : labia major [Multiple] : multiple [de-identified] : biopsy site at lesion in 2 o'clock position from clitoris and 10 o'clock position on the right labia minora healing well

## 2020-02-10 ENCOUNTER — APPOINTMENT (OUTPATIENT)
Dept: INTERNAL MEDICINE | Facility: CLINIC | Age: 45
End: 2020-02-10

## 2020-02-10 ENCOUNTER — OUTPATIENT (OUTPATIENT)
Dept: OUTPATIENT SERVICES | Facility: HOSPITAL | Age: 45
LOS: 1 days | End: 2020-02-10
Payer: SELF-PAY

## 2020-02-10 VITALS
DIASTOLIC BLOOD PRESSURE: 70 MMHG | HEART RATE: 84 BPM | BODY MASS INDEX: 25.3 KG/M2 | OXYGEN SATURATION: 98 % | WEIGHT: 134 LBS | SYSTOLIC BLOOD PRESSURE: 110 MMHG | HEIGHT: 61 IN

## 2020-02-10 DIAGNOSIS — I10 ESSENTIAL (PRIMARY) HYPERTENSION: ICD-10-CM

## 2020-02-10 DIAGNOSIS — L90.0 LICHEN SCLEROSUS ET ATROPHICUS: ICD-10-CM

## 2020-02-10 PROCEDURE — G0463: CPT

## 2020-02-10 RX ORDER — CLOBETASOL PROPIONATE 0.5 MG/G
0.05 CREAM TOPICAL TWICE DAILY
Qty: 1 | Refills: 0 | Status: DISCONTINUED | COMMUNITY
Start: 2020-01-29 | End: 2020-02-10

## 2020-02-11 NOTE — PHYSICAL EXAM
Yes [Soft] : abdomen soft [Non Tender] : non-tender [de-identified] : multiple areas of black rounded macules with minimal erythema surrounding oerineum and adjacnet ot labia majora

## 2020-02-11 NOTE — END OF VISIT
[] : Resident [FreeTextEntry3] : exam as above: no evidence of rash /fungal . dark macular lesions noted as in PE. needs derm eval

## 2020-02-11 NOTE — HISTORY OF PRESENT ILLNESS
[FreeTextEntry1] : Lichen Sclerorus  [de-identified] : 43 yo F pmhx DM, Hypothyroidism, GERD, bx of benign breast mass presenting as follow up from gyn office for lichen sclerosus. Recently went to gyn, had bx of black spots around and in her vaginal area. biopsy found it to be lichen sclerosus. GYN sent referral to derm as well as clabetasol for itch. However, pt did not  medications because they were very expensive and did not believe they would help much. She states the spots are still itchy and getting worse, are uncomfortable when she is walking. She does have some chills but no fevers. The areas are itchy but not weeping. \par

## 2020-02-11 NOTE — ASSESSMENT
[FreeTextEntry1] : case dw Dr. Camargo\par RTC in 5 weeks to evaluate for chronic health care issues\par \par Davis Sheriff, pgy 1

## 2020-02-11 NOTE — REVIEW OF SYSTEMS
[Chills] : chills [Negative] : Cardiovascular [Itching] : Itching [Fever] : no fever [Recent Change In Weight] : ~T no recent weight change [Dysuria] : no dysuria [Incontinence] : no incontinence [Nocturia] : no nocturia [Hematuria] : no hematuria [Frequency] : no frequency [Vaginal Discharge] : no vaginal discharge

## 2020-02-12 DIAGNOSIS — L90.0 LICHEN SCLEROSUS ET ATROPHICUS: ICD-10-CM

## 2020-02-12 DIAGNOSIS — E11.9 TYPE 2 DIABETES MELLITUS WITHOUT COMPLICATIONS: ICD-10-CM

## 2020-02-13 DIAGNOSIS — L90.9 ATROPHIC DISORDER OF SKIN, UNSPECIFIED: ICD-10-CM

## 2020-03-16 ENCOUNTER — APPOINTMENT (OUTPATIENT)
Dept: INTERNAL MEDICINE | Facility: CLINIC | Age: 45
End: 2020-03-16

## 2020-04-09 PROBLEM — M25.559 HIP PAIN: Status: ACTIVE | Noted: 2019-08-07

## 2020-08-18 ENCOUNTER — APPOINTMENT (OUTPATIENT)
Dept: INTERNAL MEDICINE | Facility: CLINIC | Age: 45
End: 2020-08-18

## 2020-08-18 ENCOUNTER — OUTPATIENT (OUTPATIENT)
Dept: OUTPATIENT SERVICES | Facility: HOSPITAL | Age: 45
LOS: 1 days | End: 2020-08-18
Payer: SELF-PAY

## 2020-08-18 VITALS
HEIGHT: 61 IN | BODY MASS INDEX: 25.11 KG/M2 | DIASTOLIC BLOOD PRESSURE: 80 MMHG | SYSTOLIC BLOOD PRESSURE: 118 MMHG | WEIGHT: 133 LBS

## 2020-08-18 DIAGNOSIS — E11.9 TYPE 2 DIABETES MELLITUS WITHOUT COMPLICATIONS: ICD-10-CM

## 2020-08-18 PROCEDURE — G0463: CPT

## 2020-08-19 DIAGNOSIS — I10 ESSENTIAL (PRIMARY) HYPERTENSION: ICD-10-CM

## 2020-08-27 ENCOUNTER — APPOINTMENT (OUTPATIENT)
Dept: DERMATOLOGY | Facility: HOSPITAL | Age: 45
End: 2020-08-27

## 2020-08-27 ENCOUNTER — OUTPATIENT (OUTPATIENT)
Dept: OUTPATIENT SERVICES | Facility: HOSPITAL | Age: 45
LOS: 1 days | End: 2020-08-27
Payer: SELF-PAY

## 2020-08-27 VITALS
RESPIRATION RATE: 14 BRPM | TEMPERATURE: 97.2 F | SYSTOLIC BLOOD PRESSURE: 131 MMHG | DIASTOLIC BLOOD PRESSURE: 88 MMHG | HEIGHT: 61 IN | HEART RATE: 64 BPM | WEIGHT: 136 LBS | BODY MASS INDEX: 25.68 KG/M2

## 2020-08-27 DIAGNOSIS — L98.9 DISORDER OF THE SKIN AND SUBCUTANEOUS TISSUE, UNSPECIFIED: ICD-10-CM

## 2020-08-27 DIAGNOSIS — L60.3 NAIL DYSTROPHY: ICD-10-CM

## 2020-08-27 DIAGNOSIS — B36.0 PITYRIASIS VERSICOLOR: ICD-10-CM

## 2020-08-27 DIAGNOSIS — L43.9 LICHEN PLANUS, UNSPECIFIED: ICD-10-CM

## 2020-08-27 PROCEDURE — G0463: CPT

## 2020-10-24 NOTE — HEALTH RISK ASSESSMENT
[No falls in past year] : Patient reported no falls in the past year [0] : 2) Feeling down, depressed, or hopeless: Not at all (0) [[id number]] : 0

## 2020-10-24 NOTE — ASSESSMENT
[FreeTextEntry1] : Mrs Segura is a 45 y/o female w/ PMH significant fot T2DM, (A1c 8.3 on Aug 2019), hypothyroidism, GERD, Benign breast mass, depression, lichen sclerosis, who presented to the clinic for follow up.\par \par # T2DM\par - POCT A1c 8.1 today. Not medically compliant.\par - Discussed lifestyle modification including healtheir diet with decreased sugar, routine exercise, and weight loss.\par - Patient taking metformin 500mg daily on and off - increased to 500mg BID.\par - Monofilament test negative.\par - Ophthalmology referral given.\par - RTC in 10 weeks.\par \par # hypothyroidism\par - c/w current levothyroxine\par - will acquire TSH next visit with CPE.\par \par # Lichen Sclerosis\par - f/u with Derm appt on 8/27/2020.\par \par # HCM: \par - PHQ2 = 0. \par - Mammo given.

## 2020-10-24 NOTE — PHYSICAL EXAM
[No Acute Distress] : no acute distress [Well Nourished] : well nourished [Well Developed] : well developed [Normal Sclera/Conjunctiva] : normal sclera/conjunctiva [Well-Appearing] : well-appearing [EOMI] : extraocular movements intact [Normal Outer Ear/Nose] : the outer ears and nose were normal in appearance [PERRL] : pupils equal round and reactive to light [Normal Oropharynx] : the oropharynx was normal [No JVD] : no jugular venous distention [Supple] : supple [No Lymphadenopathy] : no lymphadenopathy [Thyroid Normal, No Nodules] : the thyroid was normal and there were no nodules present [No Respiratory Distress] : no respiratory distress  [Clear to Auscultation] : lungs were clear to auscultation bilaterally [No Accessory Muscle Use] : no accessory muscle use [Normal Rate] : normal rate  [Regular Rhythm] : with a regular rhythm [No Murmur] : no murmur heard [Normal S1, S2] : normal S1 and S2 [No Carotid Bruits] : no carotid bruits [Pedal Pulses Present] : the pedal pulses are present [No Edema] : there was no peripheral edema [Non Tender] : non-tender [Soft] : abdomen soft [No Masses] : no abdominal mass palpated [Non-distended] : non-distended [Normal Bowel Sounds] : normal bowel sounds [No HSM] : no HSM [No CVA Tenderness] : no CVA  tenderness [No Spinal Tenderness] : no spinal tenderness [No Joint Swelling] : no joint swelling [Grossly Normal Strength/Tone] : grossly normal strength/tone [No Rash] : no rash [Coordination Grossly Intact] : coordination grossly intact [Normal Affect] : the affect was normal [Normal Gait] : normal gait [No Focal Deficits] : no focal deficits [Normal Insight/Judgement] : insight and judgment were intact [Comprehensive Foot Exam Normal] : Right and left foot were examined and both feet are normal. No ulcers in either foot. Toes are normal and with full ROM.  Normal tactile sensation with monofilament testing throughout both feet [de-identified] : Monofilament test negative

## 2020-10-24 NOTE — REVIEW OF SYSTEMS
[Fever] : no fever [Chills] : no chills [Night Sweats] : no night sweats [Recent Change In Weight] : ~T no recent weight change [Discharge] : no discharge [Vision Problems] : no vision problems [Earache] : no earache [Nasal Discharge] : no nasal discharge [Chest Pain] : no chest pain [Orthopnea] : no orthopnea [Paroxysmal Nocturnal Dyspnea] : no paroxysmal nocturnal dyspnea [Shortness Of Breath] : no shortness of breath [Abdominal Pain] : no abdominal pain [Vomiting] : no vomiting [Dysuria] : no dysuria [Hematuria] : no hematuria [Joint Pain] : no joint pain [Muscle Pain] : no muscle pain [Itching] : no itching [Skin Rash] : no skin rash [Headache] : no headache [Anxiety] : no anxiety [Depression] : no depression [Easy Bleeding] : no easy bleeding [Easy Bruising] : no easy bruising

## 2020-10-24 NOTE — HISTORY OF PRESENT ILLNESS
[[employer] ] : provided by [employer]   [FreeTextEntry1] : [id number] [TWNoteComboBox1] : Greek [de-identified] : Mrs Segura is a 45 y/o female w/ PMH significant fot T2DM, (A1c 8.3 on Aug 2019), hypothyroidism, GERD, Benign breast mass, depression, lichen sclerosis, who presented to the clinic for follow up.\par \par Patient's POCT A1c is 8.1 today. She denies polyuria. polyphagia, polydipsia. She reports occasional difficulty to read with blurred vision that improves spontaneously but recurs without clear trigger. For past 3 weeks, patient has been having some paresthesia on right tip of finger. No urinary symptoms. She has been trying to eat healthier and exercise.  She usually eats vegetables, broccoli, Tomato, avocado, chicken, yet also has Sweet bread in the morning. still eats rice but not noodles. She drinks 2 cups of coffee daily with sugars added.  She was last seen by ophthalmology 3 years ago, and was told that she has some abnormal finding, but is not a surgical candidate. She tries to do home exercise 1hr per day, but most of time, it is difficult taking care of her daughter who is 10 y/o. Per patient she is taking metformin 500mg daily (yet does not know the name or dosage), which is not the regimen prescribed to her. Last prescription was given more than a year ago. \par \par

## 2020-12-16 PROBLEM — Z01.419 ENCOUNTER FOR ROUTINE GYNECOLOGICAL EXAMINATION WITH PAPANICOLAOU SMEAR OF CERVIX: Status: RESOLVED | Noted: 2018-01-31 | Resolved: 2020-12-16

## 2021-02-11 ENCOUNTER — OUTPATIENT (OUTPATIENT)
Dept: OUTPATIENT SERVICES | Facility: HOSPITAL | Age: 46
LOS: 1 days | End: 2021-02-11
Payer: SELF-PAY

## 2021-02-11 ENCOUNTER — RESULT REVIEW (OUTPATIENT)
Age: 46
End: 2021-02-11

## 2021-02-11 ENCOUNTER — APPOINTMENT (OUTPATIENT)
Dept: DERMATOLOGY | Facility: HOSPITAL | Age: 46
End: 2021-02-11
Payer: COMMERCIAL

## 2021-02-11 VITALS
TEMPERATURE: 97 F | RESPIRATION RATE: 16 BRPM | BODY MASS INDEX: 25.68 KG/M2 | SYSTOLIC BLOOD PRESSURE: 137 MMHG | DIASTOLIC BLOOD PRESSURE: 85 MMHG | HEART RATE: 77 BPM | HEIGHT: 61 IN | WEIGHT: 136 LBS

## 2021-02-11 DIAGNOSIS — D48.5 NEOPLASM OF UNCERTAIN BEHAVIOR OF SKIN: ICD-10-CM

## 2021-02-11 DIAGNOSIS — B35.3 TINEA PEDIS: ICD-10-CM

## 2021-02-11 DIAGNOSIS — L98.9 DISORDER OF THE SKIN AND SUBCUTANEOUS TISSUE, UNSPECIFIED: ICD-10-CM

## 2021-02-11 DIAGNOSIS — L43.9 LICHEN PLANUS, UNSPECIFIED: ICD-10-CM

## 2021-02-11 DIAGNOSIS — L60.3 NAIL DYSTROPHY: ICD-10-CM

## 2021-02-11 DIAGNOSIS — L73.2 HIDRADENITIS SUPPURATIVA: ICD-10-CM

## 2021-02-11 PROCEDURE — 88304 TISSUE EXAM BY PATHOLOGIST: CPT

## 2021-02-11 PROCEDURE — 99213 OFFICE O/P EST LOW 20 MIN: CPT | Mod: GC

## 2021-02-11 PROCEDURE — 88312 SPECIAL STAINS GROUP 1: CPT

## 2021-02-11 PROCEDURE — 80053 COMPREHEN METABOLIC PANEL: CPT

## 2021-02-11 PROCEDURE — 88304 TISSUE EXAM BY PATHOLOGIST: CPT | Mod: 26

## 2021-02-11 PROCEDURE — 88312 SPECIAL STAINS GROUP 1: CPT | Mod: 26

## 2021-02-11 PROCEDURE — G0463: CPT

## 2021-02-11 RX ORDER — TACROLIMUS 1 MG/G
0.1 OINTMENT TOPICAL
Qty: 1 | Refills: 4 | Status: DISCONTINUED | COMMUNITY
Start: 2020-08-27 | End: 2021-02-11

## 2021-02-11 RX ORDER — MOMETASONE FUROATE 1 MG/G
0.1 CREAM TOPICAL TWICE DAILY
Qty: 1 | Refills: 0 | Status: DISCONTINUED | COMMUNITY
Start: 2020-02-10 | End: 2021-02-11

## 2021-02-12 LAB
ALBUMIN SERPL ELPH-MCNC: 4.4 G/DL — SIGNIFICANT CHANGE UP (ref 3.3–5)
ALP SERPL-CCNC: 105 U/L — SIGNIFICANT CHANGE UP (ref 40–120)
ALT FLD-CCNC: 19 U/L — SIGNIFICANT CHANGE UP (ref 10–45)
ANION GAP SERPL CALC-SCNC: 14 MMOL/L — SIGNIFICANT CHANGE UP (ref 5–17)
AST SERPL-CCNC: 21 U/L — SIGNIFICANT CHANGE UP (ref 10–40)
BILIRUB SERPL-MCNC: 0.3 MG/DL — SIGNIFICANT CHANGE UP (ref 0.2–1.2)
BUN SERPL-MCNC: 8 MG/DL — SIGNIFICANT CHANGE UP (ref 7–23)
CALCIUM SERPL-MCNC: 9.5 MG/DL — SIGNIFICANT CHANGE UP (ref 8.4–10.5)
CHLORIDE SERPL-SCNC: 107 MMOL/L — SIGNIFICANT CHANGE UP (ref 96–108)
CO2 SERPL-SCNC: 21 MMOL/L — LOW (ref 22–31)
CREAT SERPL-MCNC: 0.59 MG/DL — SIGNIFICANT CHANGE UP (ref 0.5–1.3)
GLUCOSE SERPL-MCNC: 110 MG/DL — HIGH (ref 70–99)
POTASSIUM SERPL-MCNC: 4.2 MMOL/L — SIGNIFICANT CHANGE UP (ref 3.5–5.3)
POTASSIUM SERPL-SCNC: 4.2 MMOL/L — SIGNIFICANT CHANGE UP (ref 3.5–5.3)
PROT SERPL-MCNC: 7.2 G/DL — SIGNIFICANT CHANGE UP (ref 6–8.3)
SODIUM SERPL-SCNC: 142 MMOL/L — SIGNIFICANT CHANGE UP (ref 135–145)

## 2021-02-16 LAB — SURGICAL PATHOLOGY STUDY: SIGNIFICANT CHANGE UP

## 2021-02-19 DIAGNOSIS — B35.1 TINEA UNGUIUM: ICD-10-CM

## 2021-03-26 ENCOUNTER — NON-APPOINTMENT (OUTPATIENT)
Age: 46
End: 2021-03-26

## 2021-03-29 ENCOUNTER — OUTPATIENT (OUTPATIENT)
Dept: OUTPATIENT SERVICES | Facility: HOSPITAL | Age: 46
LOS: 1 days | End: 2021-03-29
Payer: SELF-PAY

## 2021-03-29 DIAGNOSIS — L60.3 NAIL DYSTROPHY: ICD-10-CM

## 2021-03-29 DIAGNOSIS — L43.9 LICHEN PLANUS, UNSPECIFIED: ICD-10-CM

## 2021-03-29 DIAGNOSIS — L73.2 HIDRADENITIS SUPPURATIVA: ICD-10-CM

## 2021-03-29 DIAGNOSIS — B35.3 TINEA PEDIS: ICD-10-CM

## 2021-03-29 PROCEDURE — 80053 COMPREHEN METABOLIC PANEL: CPT

## 2021-03-30 LAB
ALBUMIN SERPL ELPH-MCNC: 4.4 G/DL — SIGNIFICANT CHANGE UP (ref 3.3–5)
ALP SERPL-CCNC: 116 U/L — SIGNIFICANT CHANGE UP (ref 40–120)
ALT FLD-CCNC: 13 U/L — SIGNIFICANT CHANGE UP (ref 10–45)
ANION GAP SERPL CALC-SCNC: 9 MMOL/L — SIGNIFICANT CHANGE UP (ref 5–17)
AST SERPL-CCNC: 17 U/L — SIGNIFICANT CHANGE UP (ref 10–40)
BILIRUB SERPL-MCNC: 0.2 MG/DL — SIGNIFICANT CHANGE UP (ref 0.2–1.2)
BUN SERPL-MCNC: 7 MG/DL — SIGNIFICANT CHANGE UP (ref 7–23)
CALCIUM SERPL-MCNC: 9.9 MG/DL — SIGNIFICANT CHANGE UP (ref 8.4–10.5)
CHLORIDE SERPL-SCNC: 103 MMOL/L — SIGNIFICANT CHANGE UP (ref 96–108)
CO2 SERPL-SCNC: 26 MMOL/L — SIGNIFICANT CHANGE UP (ref 22–31)
CREAT SERPL-MCNC: 0.49 MG/DL — LOW (ref 0.5–1.3)
GLUCOSE SERPL-MCNC: 141 MG/DL — HIGH (ref 70–99)
POTASSIUM SERPL-MCNC: 4 MMOL/L — SIGNIFICANT CHANGE UP (ref 3.5–5.3)
POTASSIUM SERPL-SCNC: 4 MMOL/L — SIGNIFICANT CHANGE UP (ref 3.5–5.3)
PROT SERPL-MCNC: 7.3 G/DL — SIGNIFICANT CHANGE UP (ref 6–8.3)
SODIUM SERPL-SCNC: 138 MMOL/L — SIGNIFICANT CHANGE UP (ref 135–145)

## 2021-06-18 ENCOUNTER — APPOINTMENT (OUTPATIENT)
Dept: INTERNAL MEDICINE | Facility: CLINIC | Age: 46
End: 2021-06-18

## 2021-07-08 ENCOUNTER — APPOINTMENT (OUTPATIENT)
Dept: OPHTHALMOLOGY | Facility: CLINIC | Age: 46
End: 2021-07-08

## 2021-12-17 ENCOUNTER — EMERGENCY (EMERGENCY)
Facility: HOSPITAL | Age: 46
LOS: 1 days | Discharge: HOME | End: 2021-12-17
Attending: EMERGENCY MEDICINE
Payer: SELF-PAY

## 2021-12-17 VITALS
RESPIRATION RATE: 16 BRPM | SYSTOLIC BLOOD PRESSURE: 138 MMHG | HEIGHT: 62 IN | DIASTOLIC BLOOD PRESSURE: 93 MMHG | TEMPERATURE: 98 F | OXYGEN SATURATION: 98 % | WEIGHT: 134.92 LBS | HEART RATE: 78 BPM

## 2021-12-17 PROCEDURE — 99283 EMERGENCY DEPT VISIT LOW MDM: CPT

## 2021-12-17 PROCEDURE — 99284 EMERGENCY DEPT VISIT MOD MDM: CPT

## 2021-12-17 RX ORDER — CYCLOBENZAPRINE HYDROCHLORIDE 10 MG/1
1 TABLET, FILM COATED ORAL
Qty: 15 | Refills: 0
Start: 2021-12-17 | End: 2021-12-21

## 2021-12-17 RX ORDER — IBUPROFEN 200 MG
400 TABLET ORAL ONCE
Refills: 0 | Status: COMPLETED | OUTPATIENT
Start: 2021-12-17 | End: 2021-12-17

## 2021-12-17 RX ORDER — DIAZEPAM 5 MG
5 TABLET ORAL ONCE
Refills: 0 | Status: DISCONTINUED | OUTPATIENT
Start: 2021-12-17 | End: 2021-12-17

## 2021-12-17 RX ORDER — LIDOCAINE 4 G/100G
1 CREAM TOPICAL ONCE
Refills: 0 | Status: COMPLETED | OUTPATIENT
Start: 2021-12-17 | End: 2021-12-17

## 2021-12-17 RX ADMIN — Medication 400 MILLIGRAM(S): at 15:30

## 2021-12-17 RX ADMIN — Medication 5 MILLIGRAM(S): at 15:07

## 2021-12-17 RX ADMIN — Medication 400 MILLIGRAM(S): at 15:08

## 2021-12-17 RX ADMIN — LIDOCAINE 1 PATCH: 4 CREAM TOPICAL at 15:07

## 2021-12-17 NOTE — ED PROVIDER NOTE - PHYSICAL EXAMINATION
GENERAL: Awake, alert   HEENT: NC/AT, moist mucous membranes   LUNGS: CTAB, no wheezes or crackles   CARDIAC: RRR, no m/r/g  ABDOMEN: Soft, non tender, non distended, no rebound, no guarding  BACK: No midline spinal tenderness. no paraspinal tenderness. back pain exacerbated by walking and laying. Pos leg raise bilaterally worse on the R lower ext.  EXT: No edema, no calf tenderness. Motor strength intact all extremities  NEURO: A&Ox3. Moving all extremities.  SKIN: Warm and dry.

## 2021-12-17 NOTE — ED PROVIDER NOTE - ATTENDING CONTRIBUTION TO CARE
Attending MD Wisdom:  I personally have seen and examined this patient.  Resident note reviewed and agree on plan of care and except where noted.

## 2021-12-17 NOTE — ED PROVIDER NOTE - NSICDXPASTMEDICALHX_GEN_ALL_CORE_FT
PAST MEDICAL HISTORY:  GERD (gastroesophageal reflux disease)     Hypothyroid     Latent tuberculosis

## 2021-12-17 NOTE — ED PROVIDER NOTE - OBJECTIVE STATEMENT
45yo female pt PMH DM, hypothyroidism who presents to ED w complaints of lumbosacral pain on BL that began 4 days ago and has since worsened. Pain began gradually w no associated triggers or events such as trauma or heavy lifting. Patient denies saddle anesthesia, urinary retention, loss of sphincter control, fevers, etc. Denies radiation of pain. Pain worse with walking and laying down. Denies loss of sensation or motor weakness.

## 2021-12-17 NOTE — ED PROVIDER NOTE - NSFOLLOWUPINSTRUCTIONS_ED_ALL_ED_FT
Se evaluo en el radha de hoy por dolor en la espalda baja. Se le thang medicamento para el dolor y un relajante muscular. Se le envio un relajante muscular a shirley farmacia. Favor no guiar bajo los efectos. Se lo puedo sathya en la noche antes de dormir. Puede sathya Tylenol y/o Motrin para el dolor. Por favor sigase con shirley doctor primario para seguimiento. Si siente falta de sensacion, perdida de fuerza, dolor que no mejora; puede regresar a gallo de emergencias.     Back Pain    Back pain is very common in adults. The cause of back pain is rarely dangerous and the pain often gets better over time. The cause of your back pain may not be known and may include strain of muscles or ligaments, degeneration of the spinal disks, or arthritis. Occasionally the pain may radiate down your leg(s). Over-the-counter medicines to reduce pain and inflammation are often the most helpful. Stretching and remaining active frequently helps the healing process.     SEEK IMMEDIATE MEDICAL CARE IF YOU HAVE THE FOLLOWING SYMPTOMS: bowel or bladder control problems, unusual weakness or numbness in your arms or legs, nausea or vomiting, abdominal pain, fever, dizziness/lightheadedness.

## 2021-12-17 NOTE — ED PROVIDER NOTE - CLINICAL SUMMARY MEDICAL DECISION MAKING FREE TEXT BOX
47yo female pt who presents to ED w 4 day hx of lumbosacral back pain that has been worsening since onset. Denies trauma, heavy lifting or any triggering event. Patient refers pain worse with ambulation and laying down. Most likely muscle spasm/nerve impingement vs less likely compression fracture vs cauda equina. Will provide pain management. Will most likely dc 45yo female pt who presents to ED w 4 day hx of lumbosacral back pain that has been worsening since onset. Denies trauma, heavy lifting or any triggering event. Patient refers pain worse with ambulation and laying down. Most likely muscle spasm/nerve impingement vs less likely compression fracture vs cauda equina. Will provide pain management. Will most likely dc    Attending MD Wisdom: 45yo female pt PMH DM, hypothyroidism who presents to ED w complaints of lumbosacral pain on BL that began 4 days ago and has since worsened.  No bowel or bladder symptoms.  No LE motor or sensory changes.  No red flags.  Works cleaning houses.  Bilateral lumbar paraspinal pain on exam.  Neurologic exam normal.  Plan:  pain control and re-eval.

## 2021-12-17 NOTE — ED PROVIDER NOTE - NS ED ROS FT
CONST: no fevers, no chills  EYES: no pain, no vision changes  ENT: no sore throat, no ear pain, no change in hearing  CV: no chest pain, no leg swelling  RESP: no shortness of breath, no cough  ABD: no abdominal pain, no nausea, no vomiting, no diarrhea  : no dysuria, no flank pain, no hematuria  MSK: pos back pain; no ext pain  NEURO: no headache or additional neurologic complaints  HEME: no easy bleeding  SKIN:  no rash

## 2021-12-17 NOTE — ED PROVIDER NOTE - PATIENT PORTAL LINK FT
You can access the FollowMyHealth Patient Portal offered by [facility] by registering at the following website: [email or web address]. By joining [employer]’s FollowMyHealth portal, you will also be able to view your health information using other applications (apps) compatible with our system.

## 2022-02-08 ENCOUNTER — RESULT CHARGE (OUTPATIENT)
Age: 47
End: 2022-02-08

## 2022-02-08 ENCOUNTER — APPOINTMENT (OUTPATIENT)
Dept: INTERNAL MEDICINE | Facility: CLINIC | Age: 47
End: 2022-02-08
Payer: COMMERCIAL

## 2022-02-08 ENCOUNTER — NON-APPOINTMENT (OUTPATIENT)
Age: 47
End: 2022-02-08

## 2022-02-08 ENCOUNTER — OUTPATIENT (OUTPATIENT)
Dept: OUTPATIENT SERVICES | Facility: HOSPITAL | Age: 47
LOS: 1 days | End: 2022-02-08
Payer: SELF-PAY

## 2022-02-08 VITALS
DIASTOLIC BLOOD PRESSURE: 88 MMHG | HEIGHT: 61 IN | HEART RATE: 71 BPM | SYSTOLIC BLOOD PRESSURE: 138 MMHG | OXYGEN SATURATION: 99 % | WEIGHT: 131 LBS | BODY MASS INDEX: 24.73 KG/M2

## 2022-02-08 DIAGNOSIS — I10 ESSENTIAL (PRIMARY) HYPERTENSION: ICD-10-CM

## 2022-02-08 DIAGNOSIS — E66.3 OVERWEIGHT: ICD-10-CM

## 2022-02-08 PROCEDURE — 85025 COMPLETE CBC W/AUTO DIFF WBC: CPT

## 2022-02-08 PROCEDURE — T1013: CPT

## 2022-02-08 PROCEDURE — G0463: CPT

## 2022-02-08 PROCEDURE — 80053 COMPREHEN METABOLIC PANEL: CPT

## 2022-02-08 PROCEDURE — ZZZZZ: CPT

## 2022-02-08 PROCEDURE — 80061 LIPID PANEL: CPT

## 2022-02-09 LAB
ALBUMIN SERPL ELPH-MCNC: 4.3 G/DL
ALBUMIN: NORMAL
ALP BLD-CCNC: 105 U/L
ALT SERPL-CCNC: 34 U/L
ANION GAP SERPL CALC-SCNC: 13 MMOL/L
AST SERPL-CCNC: 34 U/L
BASOPHILS # BLD AUTO: 0.1 K/UL
BASOPHILS NFR BLD AUTO: 1.1 %
BILIRUB SERPL-MCNC: 0.3 MG/DL
BUN SERPL-MCNC: 9 MG/DL
CALCIUM SERPL-MCNC: 9.6 MG/DL
CHLORIDE SERPL-SCNC: 104 MMOL/L
CHOLEST SERPL-MCNC: 175 MG/DL
CO2 SERPL-SCNC: 23 MMOL/L
CREAT SERPL-MCNC: 0.49 MG/DL
CREATININE: NORMAL
EOSINOPHIL # BLD AUTO: 0.19 K/UL
EOSINOPHIL NFR BLD AUTO: 2 %
GLUCOSE SERPL-MCNC: 100 MG/DL
HCT VFR BLD CALC: 41.7 %
HDLC SERPL-MCNC: 40 MG/DL
HGB BLD-MCNC: 13.5 G/DL
IMM GRANULOCYTES NFR BLD AUTO: 0.3 %
LDLC SERPL CALC-MCNC: 109 MG/DL
LYMPHOCYTES # BLD AUTO: 2.31 K/UL
LYMPHOCYTES NFR BLD AUTO: 24.8 %
MAN DIFF?: NORMAL
MCHC RBC-ENTMCNC: 28.4 PG
MCHC RBC-ENTMCNC: 32.4 GM/DL
MCV RBC AUTO: 87.8 FL
MICROALBUMIN/CREAT UR TEST STR-RTO: NORMAL
MONOCYTES # BLD AUTO: 0.62 K/UL
MONOCYTES NFR BLD AUTO: 6.7 %
NEUTROPHILS # BLD AUTO: 6.06 K/UL
NEUTROPHILS NFR BLD AUTO: 65.1 %
NONHDLC SERPL-MCNC: 135 MG/DL
PLATELET # BLD AUTO: 412 K/UL
POTASSIUM SERPL-SCNC: 3.9 MMOL/L
PROT SERPL-MCNC: 7.2 G/DL
RBC # BLD: 4.75 M/UL
RBC # FLD: 15.6 %
SODIUM SERPL-SCNC: 140 MMOL/L
TRIGL SERPL-MCNC: 129 MG/DL
WBC # FLD AUTO: 9.31 K/UL

## 2022-02-11 LAB — HBA1C MFR BLD HPLC: 7.9

## 2022-02-11 NOTE — REVIEW OF SYSTEMS
[Negative] : ENT [Fever] : no fever [Chills] : no chills [Chest Pain] : no chest pain [Palpitations] : no palpitations [Lower Ext Edema] : no lower extremity edema [Shortness Of Breath] : no shortness of breath [Cough] : no cough [Abdominal Pain] : no abdominal pain [Nausea] : no nausea [Constipation] : no constipation [Diarrhea] : diarrhea [Vomiting] : no vomiting [Headache] : no headache [Confusion] : no confusion

## 2022-02-11 NOTE — ASSESSMENT
[FreeTextEntry1] : 47 y/o female with pmh of T2DM, Hypothyroidism, GERD, Benign breast mass, Depression, and Lichen sclerosis who presented to the clinic for follow up for her DM.

## 2022-02-11 NOTE — PHYSICAL EXAM
[No Acute Distress] : no acute distress [Well Nourished] : well nourished [Well Developed] : well developed [Well-Appearing] : well-appearing [No Respiratory Distress] : no respiratory distress  [No Accessory Muscle Use] : no accessory muscle use [Clear to Auscultation] : lungs were clear to auscultation bilaterally [Normal Rate] : normal rate  [Regular Rhythm] : with a regular rhythm [Normal S1, S2] : normal S1 and S2 [No Murmur] : no murmur heard [Soft] : abdomen soft [Non Tender] : non-tender [Normal Bowel Sounds] : normal bowel sounds [No Focal Deficits] : no focal deficits [Comprehensive Foot Exam Normal] : Right and left foot were examined and both feet are normal. No ulcers in either foot. Toes are normal and with full ROM.  Normal tactile sensation with monofilament testing throughout both feet [Normal Sclera/Conjunctiva] : normal sclera/conjunctiva [Normal Outer Ear/Nose] : the outer ears and nose were normal in appearance [Supple] : supple [No Edema] : there was no peripheral edema [Speech Grossly Normal] : speech grossly normal

## 2022-02-11 NOTE — PLAN
[FreeTextEntry1] : # Diabetes Mellitus\par HbA1c: 7.9, improved from greater than 8 previously\par Normal urine microalbumin/creatinine ratio\par Counseled on when to check her fingerstick, prior to meals \par Increased Metformin to 1000 mg BID to help improve HbA1c greater\par Ophthamology referral given\par Foot exam normal \par Due for labs, CBC, CMP, lipid panel, and TSH ordered for upcoming CPE\par \par RTC in 5 weeks for CPE\par \par Case discussed with Dr. Mcpherson\par \par Albertina Patrick MD, PGY2\par Internal Medicine\par

## 2022-02-17 DIAGNOSIS — E03.8 OTHER SPECIFIED HYPOTHYROIDISM: ICD-10-CM

## 2022-02-17 DIAGNOSIS — E11.9 TYPE 2 DIABETES MELLITUS WITHOUT COMPLICATIONS: ICD-10-CM

## 2022-02-17 DIAGNOSIS — E66.3 OVERWEIGHT: ICD-10-CM

## 2022-03-09 ENCOUNTER — APPOINTMENT (OUTPATIENT)
Dept: OPHTHALMOLOGY | Facility: CLINIC | Age: 47
End: 2022-03-09
Payer: COMMERCIAL

## 2022-03-09 ENCOUNTER — NON-APPOINTMENT (OUTPATIENT)
Age: 47
End: 2022-03-09

## 2022-03-09 PROCEDURE — 92004 COMPRE OPH EXAM NEW PT 1/>: CPT

## 2022-03-15 ENCOUNTER — APPOINTMENT (OUTPATIENT)
Dept: INTERNAL MEDICINE | Facility: CLINIC | Age: 47
End: 2022-03-15
Payer: COMMERCIAL

## 2022-03-15 ENCOUNTER — LABORATORY RESULT (OUTPATIENT)
Age: 47
End: 2022-03-15

## 2022-03-15 ENCOUNTER — OUTPATIENT (OUTPATIENT)
Dept: OUTPATIENT SERVICES | Facility: HOSPITAL | Age: 47
LOS: 1 days | End: 2022-03-15
Payer: SELF-PAY

## 2022-03-15 VITALS
HEART RATE: 83 BPM | WEIGHT: 132 LBS | SYSTOLIC BLOOD PRESSURE: 118 MMHG | OXYGEN SATURATION: 98 % | HEIGHT: 61 IN | BODY MASS INDEX: 24.92 KG/M2 | DIASTOLIC BLOOD PRESSURE: 84 MMHG

## 2022-03-15 DIAGNOSIS — E03.8 OTHER SPECIFIED HYPOTHYROIDISM: ICD-10-CM

## 2022-03-15 DIAGNOSIS — K21.9 GASTRO-ESOPHAGEAL REFLUX DISEASE W/OUT ESOPHAGITIS: ICD-10-CM

## 2022-03-15 DIAGNOSIS — F32.A DEPRESSION, UNSPECIFIED: ICD-10-CM

## 2022-03-15 DIAGNOSIS — I10 ESSENTIAL (PRIMARY) HYPERTENSION: ICD-10-CM

## 2022-03-15 PROCEDURE — G0463: CPT

## 2022-03-15 PROCEDURE — ZZZZZ: CPT

## 2022-03-15 PROCEDURE — 82652 VIT D 1 25-DIHYDROXY: CPT

## 2022-03-15 PROCEDURE — 84443 ASSAY THYROID STIM HORMONE: CPT

## 2022-03-15 PROCEDURE — 84439 ASSAY OF FREE THYROXINE: CPT

## 2022-03-15 PROCEDURE — T1013: CPT

## 2022-03-15 RX ORDER — KETOCONAZOLE 20 MG/G
2 CREAM TOPICAL
Qty: 1 | Refills: 5 | Status: DISCONTINUED | COMMUNITY
Start: 2021-02-11 | End: 2022-03-15

## 2022-03-15 RX ORDER — CLINDAMYCIN PHOSPHATE 10 MG/ML
1 LOTION TOPICAL
Qty: 1 | Refills: 5 | Status: DISCONTINUED | COMMUNITY
Start: 2021-02-11 | End: 2022-03-15

## 2022-03-15 RX ORDER — CHOLECALCIFEROL (VITAMIN D3) 50 MCG
50 MCG TABLET ORAL
Qty: 60 | Refills: 3 | Status: DISCONTINUED | COMMUNITY
Start: 2018-01-19 | End: 2022-03-15

## 2022-03-15 RX ORDER — TERBINAFINE HYDROCHLORIDE 250 MG/1
250 TABLET ORAL DAILY
Qty: 30 | Refills: 1 | Status: DISCONTINUED | COMMUNITY
Start: 2021-02-19 | End: 2022-03-15

## 2022-03-15 NOTE — HISTORY OF PRESENT ILLNESS
[[employer] ] : provided by [employer]   [Time Spent: ____ minutes] : Total time spent using  services: [unfilled] minutes. The patient's primary language is not English thus required  services. [FreeTextEntry1] : CPE [Interpreters_IDNumber] : [id number] [de-identified] : 45 y/o female with pmh of T2DM, Hypothyroidism, GERD, benign breast mass, and lichen planus who presents to the clinic for CPE. The pt is feeling well, however has had worsening episodes of nausea which she believes is secondary to increasing the dosage of her metformin. Sometimes when the pt eats she feels discomfort in the left upper quadrant and epigastric discomfort. Years ago saw a liver  specialist with fatty liver, were interested in doing a biopsy, however given improvement of liver enzymes this was not continued.\par Her diet consists of chicken (baked), meat, rice, and pasta and running occasionally. In terms of her DM, her fingersticks in the AM (the only time she checks) have ranged from 130-160. Denies any hypoglycemic events.\par

## 2022-03-15 NOTE — REVIEW OF SYSTEMS
[Hot Flashes] : hot flashes [Pain] : pain [Redness] : redness [Vision Problems] : vision problems [Abdominal Pain] : abdominal pain [Nausea] : nausea [Headache] : headache [Fever] : no fever [Chills] : no chills [Fatigue] : no fatigue [Night Sweats] : no night sweats [Recent Change In Weight] : ~T no recent weight change [Discharge] : no discharge [Dryness] : no dryness  [Earache] : no earache [Hearing Loss] : no hearing loss [Nosebleed] : no nosebleeds [Hoarseness] : no hoarseness [Nasal Discharge] : no nasal discharge [Sore Throat] : no sore throat [Chest Pain] : no chest pain [Palpitations] : no palpitations [Lower Ext Edema] : no lower extremity edema [Paroxysmal Nocturnal Dyspnea] : no paroxysmal nocturnal dyspnea [Shortness Of Breath] : no shortness of breath [Wheezing] : no wheezing [Cough] : no cough [Constipation] : no constipation [Diarrhea] : diarrhea [Vomiting] : no vomiting [Heartburn] : no heartburn [Melena] : no melena [Dysuria] : no dysuria [Incontinence] : no incontinence [Nocturia] : no nocturia [Hematuria] : no hematuria [Frequency] : no frequency [Vaginal Discharge] : no vaginal discharge [Joint Pain] : no joint pain [Joint Swelling] : no joint swelling [Back Pain] : no back pain [Nail Changes] : no nail changes [Hair Changes] : no hair changes [Skin Rash] : no skin rash [Dizziness] : no dizziness [Memory Loss] : no memory loss [Unsteady Walking] : no ataxia [Anxiety] : no anxiety [Depression] : no depression [Easy Bruising] : no easy bruising [FreeTextEntry3] : Blurry vision, seen by ophthalmologist on 3/9/22, going to start wearing glasses

## 2022-03-15 NOTE — PLAN
[FreeTextEntry1] : \par # Type 2 DM\par HbA1c: 7.9 in 2/2022\par Urine microalbumin/creatinine ratio: normal in 2/2022\par Pt lowered metformin from 1000 to 500 mg BID as she was having increased nausea, will continue on metformin 500 mg BID for now\par Counseled to continue to check fingersticks prior to meals\par Next DM follow up in 05/2022, will need to adjust medication regimen if A1c is not improving or increasing\par Seen by opthalmology on 3/9/2022, suspicion for glaucoma, repeat exam in 1 year\par Foot exam normal from prior visit in 2/2022\par \par # Hypothyroidism\par TSH level ordered\par Continue on synthroid 88 mcg QD for now\par \par # GERD\par No active symptoms at this time\par Not taking any current medication for this \par \par # Benign breast mass\par Mammogram and Ultrasound of breast referrals provided to pt and instructed to make the appointments\par \par # Depression\par PHQ 2: negative from 2/2021\par Without symptoms of depressed mood or sadness\par \par # HCM\par COVID vaccine: pt declined vaccine as states is fearful of vaccines, encouraged the pt to reconsider getting the vaccine \par Flu and pneumonia vaccines deferred at this time as pt states she gets body aches and doesn't feel after receiving them which will make it hard for her to work this week, states she will return on Friday to receive them\par PHQ-2: negative from 2/2021\par Vit. D level ordered (pt with occasional joint discomfort on ambulation, recent Calcium level normal, previously was on Vit. D supplements\par Previous CBC, CMP, and lipid panel drawn, without significant findings \par \par RTC in 3 months for DM follow up\par \par Case discussed with Dr. Mcpherson\par \par Albertina Patrick MD, PGY2\par Internal Medicine

## 2022-03-15 NOTE — ASSESSMENT
[FreeTextEntry1] : 45 y/o female with pmh of T2DM, Hypothyroidism, GERD, benign breast mass, depression, and lichen planus who presents to the clinic for CPE.

## 2022-03-15 NOTE — PHYSICAL EXAM
[No Acute Distress] : no acute distress [Well Nourished] : well nourished [Well Developed] : well developed [Well-Appearing] : well-appearing [Normal Sclera/Conjunctiva] : normal sclera/conjunctiva [PERRL] : pupils equal round and reactive to light [EOMI] : extraocular movements intact [Normal Outer Ear/Nose] : the outer ears and nose were normal in appearance [Normal Oropharynx] : the oropharynx was normal [Normal TMs] : both tympanic membranes were normal [No JVD] : no jugular venous distention [No Lymphadenopathy] : no lymphadenopathy [Supple] : supple [Thyroid Normal, No Nodules] : the thyroid was normal and there were no nodules present [No Respiratory Distress] : no respiratory distress  [No Accessory Muscle Use] : no accessory muscle use [Clear to Auscultation] : lungs were clear to auscultation bilaterally [Normal Rate] : normal rate  [Regular Rhythm] : with a regular rhythm [Normal S1, S2] : normal S1 and S2 [No Murmur] : no murmur heard [No Edema] : there was no peripheral edema [Soft] : abdomen soft [Non Tender] : non-tender [Non-distended] : non-distended [No HSM] : no HSM [Normal Bowel Sounds] : normal bowel sounds [Normal Posterior Cervical Nodes] : no posterior cervical lymphadenopathy [Normal Anterior Cervical Nodes] : no anterior cervical lymphadenopathy [No CVA Tenderness] : no CVA  tenderness [No Spinal Tenderness] : no spinal tenderness [No Joint Swelling] : no joint swelling [Grossly Normal Strength/Tone] : grossly normal strength/tone [No Rash] : no rash [Coordination Grossly Intact] : coordination grossly intact [No Focal Deficits] : no focal deficits [Normal Gait] : normal gait [Deep Tendon Reflexes (DTR)] : deep tendon reflexes were 2+ and symmetric [Normal Affect] : the affect was normal [Normal Insight/Judgement] : insight and judgment were intact

## 2022-03-17 LAB
24R-OH-CALCIDIOL SERPL-MCNC: 92.8 PG/ML
TSH SERPL-ACNC: 6.18 UIU/ML

## 2022-03-18 DIAGNOSIS — F32.A DEPRESSION, UNSPECIFIED: ICD-10-CM

## 2022-03-18 DIAGNOSIS — K21.9 GASTRO-ESOPHAGEAL REFLUX DISEASE WITHOUT ESOPHAGITIS: ICD-10-CM

## 2022-03-18 DIAGNOSIS — E03.8 OTHER SPECIFIED HYPOTHYROIDISM: ICD-10-CM

## 2022-03-18 DIAGNOSIS — E11.9 TYPE 2 DIABETES MELLITUS WITHOUT COMPLICATIONS: ICD-10-CM

## 2022-12-11 NOTE — HISTORY OF PRESENT ILLNESS
[facility]  100/01  HOSPITAL MEDICINE PROGRESS NOTE   Patient: Jay Shields  Today's Date: 2022    YOB: 1974  Admission Date: 2022    MRN: [id number]  Inpatient LOS: 2    Attending: Dylan Hutson MD  Hospital Day: Hospital Day: 3    Subjective   HISTORY AND SUBJECTIVE COMPLAINTS     Chief Complaint:   Epigastric pain, nausea, vomiting    Interval History / Subjective:   Patient seen and examined at bedside. Overnight events reviewed. The patient had no overnight events. States that is tolerating clear liquids and that abdominal pain overall improving. No fevers, chills or diarrhea    Hospital Course:  Jay Shields is a 48 year old male who presented on 2022 with complaints of Abdominal Pain and MEDICATION ISSUE  .    ROS:  Pertinent systems negative except as above.    Objective   PHYSICAL EXAMINATION     Vital 24 Hour Range Most Recent Value   Temperature Temp  Min: 97.6 °F (36.4 °C)  Max: 98.1 °F (36.7 °C) 97.6 °F (36.4 °C)   Pulse Pulse  Min: 62  Max: 79 79   Respiratory Resp  Min: 8  Max: 24 19   Blood Pressure BP  Min: 129/93  Max: 139/101 (!) 136/105   Pulse Oximetry SpO2  Min: 94 %  Max: 98 % 98 %   Arterial BP No data recorded     O2 O2 Flow Rate (L/min)  Av L/min  Min: 2 L/min   Min taken time: 22 1114  Max: 2 L/min   Max taken time: 22 1114       Recorded Intake and Output:    Intake/Output Summary (Last 24 hours) at 2022 1353  Last data filed at 2022 1312  Gross per 24 hour   Intake 2387.99 ml   Output 4700 ml   Net -2312.01 ml      Recorded Last Stool Occurrence:       Vital Most Recent Value First Value   Weight 103.7 kg (228 lb 9.6 oz) Weight: 104.6 kg (230 lb 9.6 oz)   Height 5' 7\" (170.2 cm) Height: 5' 6\" (167.6 cm)   BMI 35.8 N/A     General: Looks well well developed, well nourished and no apparent distress  CV: regular rate and rhythm and no murmurs, rubs, or thrills  Resp: clear to auscultation bilaterally and no  accessory muscle use   Abd: soft, +ve abdominal tenderness diffusely- overall unchanged, nondistended and no hepatosplenomegaly  Ext: edema absent  and posterior tibial pulses equal bilaterally   Skin: no rashes, lesions, or ulcers noted  Neuro: no focal deficits noted  Psych: normal judgement and insight, oriented to time, place and person and normal mood and affect    TEST RESULTS     Labs: The Laboratory values listed below have been reviewed and pertinent findings discussed in the Assessment and Plan.    Laboratory values:   Recent Labs   Lab 12/11/22  0320 12/10/22  0322 12/09/22  1530   WBC 7.7 10.0 12.9*   HGB 13.6 12.7* 16.2   HCT 39.4 36.7* 45.4    198 256       Recent Labs   Lab 12/11/22  0902 12/11/22  0320 12/11/22  0004 12/09/22  2132 12/09/22  1530   SODIUM 136 137 140   < > 130*   POTASSIUM 3.2* 3.4 3.7   < > 3.8   CHLORIDE 104 104 105   < > 94*   CO2 23 24 21   < > 16*   CALCIUM 8.0* 7.9* 8.2*   < > 9.9   GLUCOSE 208* 193* 113*   < > 442*   BUN 3* 2* 2*   < > 11   CREATININE 0.55* 0.56* 0.53*   < > 0.82   MG  --   --   --   --  1.7    < > = values in this interval not displayed.          Radiology: Imaging studies have been reviewed and pertinent findings discussed in the Assessment and Plan.  Results for orders placed or performed during the hospital encounter of 12/09/22 (from the past 48 hour(s))   CT ABDOMEN PELVIS W CONTRAST    Impression    IMPRESSION:  1. There is some slight strandiness in the fat by the tail of the  pancreas and uncinate process of the pancreas suggestive of  pancreatitis.  2. The spleen is mildly enlarged.  4. The some borderline prominent lymph nodes in the upper abdomen  likely reactive. Emergency room was notified time of dictation  concerning these findings.          Signed by: Jalil Zaragoza        ANCILLARY ORDERS     Diet:  Consistent Carb Moderate (45-75 Gm/meal), Liquid Full Diet  Telemetry:    Consults:    IP CONSULT TO ENDOCRINOLOGY  Therapy Orders:   No  [[employer] ] : provided by [employer]   orders of the defined types were placed in this encounter.      ADVANCED DIRECTIVES     Code Status: Full Resuscitation             ASSESSMENT AND PLAN     Patient is 48 year old male with PMH of newly diagnosed DMT2, GERD, HLD, hypertriglyceridemia, HTN, that presets with epigastric pain, nausea and vomiting. Found to have possible pancreatitis on CT, as well as DKA.         DKA - uncontrolled DMT2, poor diabetic education, patient doesn't know how to use insulin at home   Started on insulin drip with D5W coverage which is being continued at this point until TG levels less than 1000 but ideally less than 500- being checked q 12 hours; AGAP has closed but TG still high  Will continue to follow frequent BMPs given potential for electrolyte derangements and adjust as needed  - will need diabetic education prior to discharge  - will consult endocrinology when available in AM  - continue fenofibrate      Acute pancreatitis - likely precipitated by hypertriglyceridemia   Was started on IVF with improving symptoms  - initially NPO which was advanced to clears 12/10 and 12/11 advanced to full liquid diet     Likely stress leucocytosis vs dehydration  With no antibiotics this improved  - low threshold for antibiotics if worsens    HTN  Holding HCTZ- lisinopril  Holding metoprolol due to severe bradycardia on 12/11 to 20s HR        Smoking status: current smoker    Nutrition status: appropriate  Body mass index is 35.8 kg/m². - Obese (BMI 30-39 without a comorbid condition or 30-34 with a comorbid condition)  DVT Prophylaxis: Lovenox prophylactic dosing (dose adjusted as per renal function)         DISCHARGE PLANNING     The patient's treatment plans were discussed with patient.        Discharge Planning           Barriers to discharge: Patient is not medically ready and needs to remain in the hospital today due to managing DKA< hypertriglyceridemia and pancreatitis  Anticipated discharge destination: Home  Expected  Discharge Date: D          Dylan Hutson MD  Hospitalist  12/11/2022  1:53 PM     [Time Spent: ____ minutes] : Total time spent using  services: [unfilled] minutes. The patient's primary language is not English thus required  services. [FreeTextEntry1] : DM follow up  [Interpreters_IDNumber] : [id number] [de-identified] : 47 y/o female with pmh of T2DM, Hypothyroidism, GERD, Benign breast mass, Depression, and Lichen sclerosis who presented to the clinic for follow up for her DM. Last HbA1c was 8.3 in 8/2019.\par The pt states that she only takes her fingersticks when she doesn't feel well, at those encounters it is: 250-300, 150 is the lowest. In terms of diet it consists of breakfast: cereal (oatmeal and cold), bread, coffee (doesn't add sugar), lunch: frutis like an apple, and dinner: chicken, meat, and rice. The pt does not exercise. She is compliant with taking metformin 500 mg BID.

## 2023-01-12 ENCOUNTER — APPOINTMENT (OUTPATIENT)
Dept: DERMATOLOGY | Facility: HOSPITAL | Age: 48
End: 2023-01-12

## 2023-09-26 ENCOUNTER — RESULT CHARGE (OUTPATIENT)
Age: 48
End: 2023-09-26

## 2023-09-26 ENCOUNTER — MED ADMIN CHARGE (OUTPATIENT)
Age: 48
End: 2023-09-26

## 2023-09-26 ENCOUNTER — OUTPATIENT (OUTPATIENT)
Dept: OUTPATIENT SERVICES | Facility: HOSPITAL | Age: 48
LOS: 1 days | End: 2023-09-26
Payer: COMMERCIAL

## 2023-09-26 ENCOUNTER — APPOINTMENT (OUTPATIENT)
Dept: INTERNAL MEDICINE | Facility: CLINIC | Age: 48
End: 2023-09-26
Payer: COMMERCIAL

## 2023-09-26 VITALS
HEIGHT: 61 IN | OXYGEN SATURATION: 98 % | HEART RATE: 75 BPM | BODY MASS INDEX: 24.55 KG/M2 | DIASTOLIC BLOOD PRESSURE: 80 MMHG | WEIGHT: 130 LBS | SYSTOLIC BLOOD PRESSURE: 122 MMHG

## 2023-09-26 DIAGNOSIS — Z23 ENCOUNTER FOR IMMUNIZATION: ICD-10-CM

## 2023-09-26 DIAGNOSIS — I10 ESSENTIAL (PRIMARY) HYPERTENSION: ICD-10-CM

## 2023-09-26 PROCEDURE — 99213 OFFICE O/P EST LOW 20 MIN: CPT | Mod: 25

## 2023-09-26 RX ORDER — DULAGLUTIDE 0.75 MG/.5ML
0.75 INJECTION, SOLUTION SUBCUTANEOUS WEEKLY
Qty: 5 | Refills: 0 | Status: ACTIVE | COMMUNITY
Start: 2023-09-26 | End: 1900-01-01

## 2023-09-28 LAB
25(OH)D3 SERPL-MCNC: 26.8 NG/ML
ALBUMIN SERPL ELPH-MCNC: 4.7 G/DL
ALP BLD-CCNC: 134 U/L
ALT SERPL-CCNC: 45 U/L
ANION GAP SERPL CALC-SCNC: 12 MMOL/L
AST SERPL-CCNC: 34 U/L
BILIRUB SERPL-MCNC: 0.2 MG/DL
BUN SERPL-MCNC: 8 MG/DL
CALCIUM SERPL-MCNC: 10 MG/DL
CHLORIDE SERPL-SCNC: 104 MMOL/L
CHOLEST SERPL-MCNC: 185 MG/DL
CO2 SERPL-SCNC: 23 MMOL/L
CREAT SERPL-MCNC: 0.48 MG/DL
EGFR: 119 ML/MIN/1.73M2
ESTIMATED AVERAGE GLUCOSE: 269 MG/DL
GLUCOSE SERPL-MCNC: 156 MG/DL
HBA1C MFR BLD HPLC: 11 %
HCT VFR BLD CALC: 44.2 %
HDLC SERPL-MCNC: 47 MG/DL
HGB BLD-MCNC: 14.5 G/DL
LDLC SERPL CALC-MCNC: 103 MG/DL
MCHC RBC-ENTMCNC: 27.9 PG
MCHC RBC-ENTMCNC: 32.8 GM/DL
MCV RBC AUTO: 85 FL
NONHDLC SERPL-MCNC: 138 MG/DL
PLATELET # BLD AUTO: 399 K/UL
POTASSIUM SERPL-SCNC: 4.1 MMOL/L
PROT SERPL-MCNC: 7.7 G/DL
RBC # BLD: 5.2 M/UL
RBC # FLD: 15.7 %
SODIUM SERPL-SCNC: 139 MMOL/L
TRIGL SERPL-MCNC: 204 MG/DL
TSH SERPL-ACNC: 8.6 UIU/ML
WBC # FLD AUTO: 8.33 K/UL

## 2023-09-28 RX ORDER — DULAGLUTIDE 1.5 MG/.5ML
1.5 INJECTION, SOLUTION SUBCUTANEOUS
Qty: 6 | Refills: 0 | Status: ACTIVE | COMMUNITY
Start: 2023-09-28 | End: 1900-01-01

## 2023-09-29 ENCOUNTER — APPOINTMENT (OUTPATIENT)
Dept: INTERNAL MEDICINE | Facility: CLINIC | Age: 48
End: 2023-09-29

## 2023-10-02 DIAGNOSIS — E06.3 AUTOIMMUNE THYROIDITIS: ICD-10-CM

## 2023-10-02 DIAGNOSIS — E11.9 TYPE 2 DIABETES MELLITUS WITHOUT COMPLICATIONS: ICD-10-CM

## 2023-10-02 DIAGNOSIS — Z23 ENCOUNTER FOR IMMUNIZATION: ICD-10-CM

## 2023-10-02 DIAGNOSIS — Z00.00 ENCOUNTER FOR GENERAL ADULT MEDICAL EXAMINATION WITHOUT ABNORMAL FINDINGS: ICD-10-CM

## 2023-10-02 DIAGNOSIS — E03.8 OTHER SPECIFIED HYPOTHYROIDISM: ICD-10-CM

## 2023-10-03 ENCOUNTER — LABORATORY RESULT (OUTPATIENT)
Age: 48
End: 2023-10-03

## 2023-10-03 PROCEDURE — 83036 HEMOGLOBIN GLYCOSYLATED A1C: CPT

## 2023-10-03 PROCEDURE — 82306 VITAMIN D 25 HYDROXY: CPT

## 2023-10-03 PROCEDURE — 90715 TDAP VACCINE 7 YRS/> IM: CPT

## 2023-10-03 PROCEDURE — T1013: CPT

## 2023-10-03 PROCEDURE — 82274 ASSAY TEST FOR BLOOD FECAL: CPT

## 2023-10-03 PROCEDURE — 90471 IMMUNIZATION ADMIN: CPT

## 2023-10-03 PROCEDURE — 80053 COMPREHEN METABOLIC PANEL: CPT

## 2023-10-03 PROCEDURE — G0463: CPT | Mod: 25

## 2023-10-03 PROCEDURE — 84443 ASSAY THYROID STIM HORMONE: CPT

## 2023-10-03 PROCEDURE — 80061 LIPID PANEL: CPT

## 2023-10-03 PROCEDURE — 85027 COMPLETE CBC AUTOMATED: CPT

## 2023-11-28 ENCOUNTER — RESULT REVIEW (OUTPATIENT)
Age: 48
End: 2023-11-28

## 2023-11-28 ENCOUNTER — APPOINTMENT (OUTPATIENT)
Dept: OBGYN | Facility: CLINIC | Age: 48
End: 2023-11-28
Payer: COMMERCIAL

## 2023-11-28 ENCOUNTER — LABORATORY RESULT (OUTPATIENT)
Age: 48
End: 2023-11-28

## 2023-11-28 ENCOUNTER — OUTPATIENT (OUTPATIENT)
Dept: OUTPATIENT SERVICES | Facility: HOSPITAL | Age: 48
LOS: 1 days | End: 2023-11-28
Payer: SELF-PAY

## 2023-11-28 VITALS — WEIGHT: 131 LBS | SYSTOLIC BLOOD PRESSURE: 148 MMHG | BODY MASS INDEX: 24.75 KG/M2 | DIASTOLIC BLOOD PRESSURE: 86 MMHG

## 2023-11-28 DIAGNOSIS — R92.2 INCONCLUSIVE MAMMOGRAM: ICD-10-CM

## 2023-11-28 DIAGNOSIS — Z01.419 ENCOUNTER FOR GYNECOLOGICAL EXAMINATION (GENERAL) (ROUTINE) W/OUT ABNORMAL FINDINGS: ICD-10-CM

## 2023-11-28 DIAGNOSIS — N95.2 POSTMENOPAUSAL ATROPHIC VAGINITIS: ICD-10-CM

## 2023-11-28 DIAGNOSIS — R92.30 INCONCLUSIVE MAMMOGRAM: ICD-10-CM

## 2023-11-28 DIAGNOSIS — N76.0 ACUTE VAGINITIS: ICD-10-CM

## 2023-11-28 PROCEDURE — 99386 PREV VISIT NEW AGE 40-64: CPT

## 2023-11-28 PROCEDURE — T1013: CPT

## 2023-11-28 PROCEDURE — 87624 HPV HI-RISK TYP POOLED RSLT: CPT

## 2023-11-28 PROCEDURE — G0463: CPT

## 2023-11-28 RX ORDER — ESTRADIOL 0.1 MG/G
0.1 CREAM VAGINAL
Qty: 1 | Refills: 3 | Status: ACTIVE | COMMUNITY
Start: 2023-11-28 | End: 1900-01-01

## 2023-11-29 LAB
HPV HIGH+LOW RISK DNA PNL CVX: SIGNIFICANT CHANGE UP
HPV HIGH+LOW RISK DNA PNL CVX: SIGNIFICANT CHANGE UP

## 2023-11-30 LAB
CYTOLOGY SPEC DOC CYTO: SIGNIFICANT CHANGE UP
CYTOLOGY SPEC DOC CYTO: SIGNIFICANT CHANGE UP

## 2023-12-12 DIAGNOSIS — Z01.419 ENCOUNTER FOR GYNECOLOGICAL EXAMINATION (GENERAL) (ROUTINE) WITHOUT ABNORMAL FINDINGS: ICD-10-CM

## 2023-12-12 DIAGNOSIS — R92.2 INCONCLUSIVE MAMMOGRAM: ICD-10-CM

## 2023-12-12 DIAGNOSIS — N95.2 POSTMENOPAUSAL ATROPHIC VAGINITIS: ICD-10-CM

## 2024-01-05 ENCOUNTER — APPOINTMENT (OUTPATIENT)
Dept: INTERNAL MEDICINE | Facility: CLINIC | Age: 49
End: 2024-01-05
Payer: COMMERCIAL

## 2024-01-05 ENCOUNTER — OUTPATIENT (OUTPATIENT)
Dept: OUTPATIENT SERVICES | Facility: HOSPITAL | Age: 49
LOS: 1 days | End: 2024-01-05
Payer: SELF-PAY

## 2024-01-05 ENCOUNTER — LABORATORY RESULT (OUTPATIENT)
Age: 49
End: 2024-01-05

## 2024-01-05 VITALS
DIASTOLIC BLOOD PRESSURE: 90 MMHG | OXYGEN SATURATION: 99 % | SYSTOLIC BLOOD PRESSURE: 122 MMHG | WEIGHT: 129 LBS | HEART RATE: 75 BPM | HEIGHT: 61 IN | BODY MASS INDEX: 24.35 KG/M2

## 2024-01-05 DIAGNOSIS — E11.9 TYPE 2 DIABETES MELLITUS W/OUT COMPLICATIONS: ICD-10-CM

## 2024-01-05 DIAGNOSIS — I10 ESSENTIAL (PRIMARY) HYPERTENSION: ICD-10-CM

## 2024-01-05 DIAGNOSIS — E03.8 OTHER SPECIFIED HYPOTHYROIDISM: ICD-10-CM

## 2024-01-05 DIAGNOSIS — E06.3 OTHER SPECIFIED HYPOTHYROIDISM: ICD-10-CM

## 2024-01-05 DIAGNOSIS — Z00.00 ENCOUNTER FOR GENERAL ADULT MEDICAL EXAMINATION W/OUT ABNORMAL FINDINGS: ICD-10-CM

## 2024-01-05 LAB — HBA1C MFR BLD HPLC: 9.4

## 2024-01-05 PROCEDURE — 99213 OFFICE O/P EST LOW 20 MIN: CPT | Mod: GC

## 2024-01-05 PROCEDURE — 82607 VITAMIN B-12: CPT

## 2024-01-05 PROCEDURE — 84443 ASSAY THYROID STIM HORMONE: CPT

## 2024-01-05 PROCEDURE — 83036 HEMOGLOBIN GLYCOSYLATED A1C: CPT

## 2024-01-05 PROCEDURE — 84439 ASSAY OF FREE THYROXINE: CPT

## 2024-01-05 RX ORDER — METFORMIN HYDROCHLORIDE 1000 MG/1
1000 TABLET, COATED ORAL TWICE DAILY
Qty: 60 | Refills: 2 | Status: ACTIVE | COMMUNITY
Start: 2018-01-19 | End: 1900-01-01

## 2024-01-05 NOTE — PHYSICAL EXAM
[No Acute Distress] : no acute distress [Well Nourished] : well nourished [Normal Sclera/Conjunctiva] : normal sclera/conjunctiva [Normal Outer Ear/Nose] : the outer ears and nose were normal in appearance [No JVD] : no jugular venous distention [No Lymphadenopathy] : no lymphadenopathy [No Respiratory Distress] : no respiratory distress  [Normal Rate] : normal rate  [No Carotid Bruits] : no carotid bruits [No Abdominal Bruit] : a ~M bruit was not heard ~T in the abdomen [No Edema] : there was no peripheral edema [Soft] : abdomen soft [No HSM] : no HSM [Normal Supraclavicular Nodes] : no supraclavicular lymphadenopathy [Normal Anterior Cervical Nodes] : no anterior cervical lymphadenopathy [No CVA Tenderness] : no CVA  tenderness [No Joint Swelling] : no joint swelling [No Rash] : no rash [Coordination Grossly Intact] : coordination grossly intact [No Focal Deficits] : no focal deficits [Normal Gait] : normal gait

## 2024-01-07 NOTE — ASSESSMENT
[FreeTextEntry1] : #DMT2 -ran out of Metformin 1 month prior & has not been taking -when she was taking, has been taking qday as opposed to BID as she states she wants to minimize medication -was prescribed trulicity OP, but did not want to take -educated patient given noncompliance -A1c on prior apt was 11% > will f/u A1c > reordered Metformin, advised pt to take BID > counseled on consistent card diet > pt will need close follow up in 1.5 months for DM recheck  #Hypothyroidism -TSH > 8 on prior apt > pt taking Synthroid 125 > will f/u TSH w/ reflex T4

## 2024-01-07 NOTE — END OF VISIT
[] : Resident [FreeTextEntry3] : poorly controlled DM-to refer to diabetic educator. Close f/u needed with resident

## 2024-01-07 NOTE — REVIEW OF SYSTEMS
[Fever] : no fever [Night Sweats] : no night sweats [Discharge] : no discharge [Vision Problems] : no vision problems [Earache] : no earache [Nasal Discharge] : no nasal discharge [Chest Pain] : no chest pain [Orthopnea] : no orthopnea [Shortness Of Breath] : no shortness of breath [Abdominal Pain] : no abdominal pain [Vomiting] : no vomiting [Dysuria] : no dysuria [Hematuria] : no hematuria [Joint Pain] : no joint pain [Muscle Pain] : no muscle pain [Itching] : no itching [Skin Rash] : no skin rash [Headache] : no headache [Memory Loss] : no memory loss [Suicidal] : not suicidal [Easy Bleeding] : no easy bleeding

## 2024-01-07 NOTE — HISTORY OF PRESENT ILLNESS
[FreeTextEntry1] : DM & TSH recheck [de-identified] : 48F w/ pmh T2DM, Hypothyroidism, GERD, benign breast mass, and lichen planus who presents to the clinic for r/p check on A1c & TSH upon recent increase of Synthroid and trulicity. At last visit, Synthroid increased from 100-> 125 in context of TSH elevation to > 8.  A1c at that time was 11%.   #Hypothyroid: No weight gain/loss, fatigue, palpitations, tremors. Currently taking synthyroid at 125 mg.   #DM: States she has been taking metformin once daily, not bid; states that blood glucose 120-130 daily, with occasional dips to ~70. Patient is not taking trulicity because she doesn't want to take it.

## 2024-01-10 LAB — VIT B12 SERPL-MCNC: 809 PG/ML

## 2024-01-11 LAB
ESTIMATED AVERAGE GLUCOSE: 194 MG/DL
HBA1C MFR BLD HPLC: 8.4 %
TSH SERPL-ACNC: 4.43 UIU/ML

## 2024-01-11 RX ORDER — LEVOTHYROXINE SODIUM 0.1 MG/1
100 TABLET ORAL DAILY
Qty: 30 | Refills: 0 | Status: DISCONTINUED | COMMUNITY
Start: 2022-03-17 | End: 2024-01-11

## 2024-01-23 ENCOUNTER — APPOINTMENT (OUTPATIENT)
Dept: ULTRASOUND IMAGING | Facility: IMAGING CENTER | Age: 49
End: 2024-01-23
Payer: SELF-PAY

## 2024-01-23 ENCOUNTER — RESULT REVIEW (OUTPATIENT)
Age: 49
End: 2024-01-23

## 2024-01-23 ENCOUNTER — OUTPATIENT (OUTPATIENT)
Dept: OUTPATIENT SERVICES | Facility: HOSPITAL | Age: 49
LOS: 1 days | End: 2024-01-23
Payer: SELF-PAY

## 2024-01-23 ENCOUNTER — APPOINTMENT (OUTPATIENT)
Dept: MAMMOGRAPHY | Facility: IMAGING CENTER | Age: 49
End: 2024-01-23
Payer: SELF-PAY

## 2024-01-23 DIAGNOSIS — R92.30 DENSE BREASTS, UNSPECIFIED: ICD-10-CM

## 2024-01-23 PROCEDURE — 77067 SCR MAMMO BI INCL CAD: CPT | Mod: 26

## 2024-01-23 PROCEDURE — 77063 BREAST TOMOSYNTHESIS BI: CPT | Mod: 26

## 2024-01-23 PROCEDURE — 77063 BREAST TOMOSYNTHESIS BI: CPT

## 2024-01-23 PROCEDURE — 76641 ULTRASOUND BREAST COMPLETE: CPT

## 2024-01-23 PROCEDURE — 77067 SCR MAMMO BI INCL CAD: CPT

## 2024-01-23 PROCEDURE — 76641 ULTRASOUND BREAST COMPLETE: CPT | Mod: 26,50

## 2024-02-06 ENCOUNTER — APPOINTMENT (OUTPATIENT)
Dept: INTERNAL MEDICINE | Facility: CLINIC | Age: 49
End: 2024-02-06

## 2024-02-16 ENCOUNTER — RX RENEWAL (OUTPATIENT)
Age: 49
End: 2024-02-16

## 2024-02-16 RX ORDER — LEVOTHYROXINE SODIUM 0.12 MG/1
125 TABLET ORAL DAILY
Qty: 30 | Refills: 0 | Status: ACTIVE | COMMUNITY
Start: 2023-09-28 | End: 1900-01-01

## 2024-07-17 NOTE — ED PROCEDURE NOTE - PROCEDURE DATE TIME, MLM
64yo F with weight loss over the past year 40lb without intention.  She had gotten a gastric bypass 30yrs ago.  no change in bowel habits, no blood in stool.  She has new onset of daily nausea and recurrent vomiting a few times per weeks, which is new for her.   CT abd wihtout contrast 6/2024 due to pelvic fracture did not show abd/eplvic pathology. 25-May-2018 15:06

## 2025-07-17 ENCOUNTER — APPOINTMENT (OUTPATIENT)
Dept: INTERNAL MEDICINE | Facility: CLINIC | Age: 50
End: 2025-07-17
Payer: SELF-PAY

## 2025-07-17 ENCOUNTER — LABORATORY RESULT (OUTPATIENT)
Age: 50
End: 2025-07-17

## 2025-07-17 VITALS
DIASTOLIC BLOOD PRESSURE: 92 MMHG | HEART RATE: 74 BPM | OXYGEN SATURATION: 98 % | WEIGHT: 127 LBS | SYSTOLIC BLOOD PRESSURE: 130 MMHG | BODY MASS INDEX: 23.98 KG/M2 | HEIGHT: 61 IN

## 2025-07-17 PROCEDURE — 99213 OFFICE O/P EST LOW 20 MIN: CPT | Mod: GE

## 2025-07-17 RX ORDER — METFORMIN HYDROCHLORIDE 850 MG/1
850 TABLET, COATED ORAL TWICE DAILY
Qty: 180 | Refills: 2 | Status: ACTIVE | COMMUNITY
Start: 2025-07-17 | End: 1900-01-01

## 2025-07-18 LAB
ALBUMIN SERPL ELPH-MCNC: 4.4 G/DL
ALBUMIN, RANDOM URINE: 2.2 MG/DL
ALP BLD-CCNC: 114 U/L
ALT SERPL-CCNC: 44 U/L
ANION GAP SERPL CALC-SCNC: 14 MMOL/L
AST SERPL-CCNC: 34 U/L
BASOPHILS # BLD AUTO: 0.05 K/UL
BASOPHILS NFR BLD AUTO: 0.5 %
BILIRUB SERPL-MCNC: 0.4 MG/DL
BUN SERPL-MCNC: 7 MG/DL
CALCIUM SERPL-MCNC: 9.1 MG/DL
CHLORIDE SERPL-SCNC: 105 MMOL/L
CHOLEST SERPL-MCNC: 173 MG/DL
CO2 SERPL-SCNC: 23 MMOL/L
CREAT SERPL-MCNC: 0.44 MG/DL
CREAT SPEC-SCNC: 165 MG/DL
EGFRCR SERPLBLD CKD-EPI 2021: 120 ML/MIN/1.73M2
EOSINOPHIL # BLD AUTO: 0.1 K/UL
EOSINOPHIL NFR BLD AUTO: 1 %
ESTIMATED AVERAGE GLUCOSE: 266 MG/DL
GLUCOSE SERPL-MCNC: 168 MG/DL
HBA1C MFR BLD HPLC: 10.9 %
HCT VFR BLD CALC: 43.8 %
HDLC SERPL-MCNC: 40 MG/DL
HGB BLD-MCNC: 14.8 G/DL
IMM GRANULOCYTES NFR BLD AUTO: 0.3 %
LDLC SERPL-MCNC: 111 MG/DL
LYMPHOCYTES # BLD AUTO: 1.84 K/UL
LYMPHOCYTES NFR BLD AUTO: 19 %
MAN DIFF?: NORMAL
MCHC RBC-ENTMCNC: 30.8 PG
MCHC RBC-ENTMCNC: 33.8 G/DL
MCV RBC AUTO: 91.1 FL
MICROALBUMIN/CREAT 24H UR-RTO: 13 MG/G
MONOCYTES # BLD AUTO: 0.59 K/UL
MONOCYTES NFR BLD AUTO: 6.1 %
NEUTROPHILS # BLD AUTO: 7.09 K/UL
NEUTROPHILS NFR BLD AUTO: 73.1 %
NONHDLC SERPL-MCNC: 133 MG/DL
PLATELET # BLD AUTO: 353 K/UL
POTASSIUM SERPL-SCNC: 3.7 MMOL/L
PROT SERPL-MCNC: 7.4 G/DL
RBC # BLD: 4.81 M/UL
RBC # FLD: 14.1 %
SODIUM SERPL-SCNC: 141 MMOL/L
TRIGL SERPL-MCNC: 122 MG/DL
TSH SERPL-ACNC: 8.27 UIU/ML
WBC # FLD AUTO: 9.7 K/UL

## 2025-08-11 ENCOUNTER — NON-APPOINTMENT (OUTPATIENT)
Age: 50
End: 2025-08-11

## 2025-08-11 ENCOUNTER — OUTPATIENT (OUTPATIENT)
Dept: OUTPATIENT SERVICES | Facility: HOSPITAL | Age: 50
LOS: 1 days | End: 2025-08-11
Payer: SELF-PAY

## 2025-08-11 ENCOUNTER — LABORATORY RESULT (OUTPATIENT)
Age: 50
End: 2025-08-11

## 2025-08-11 ENCOUNTER — APPOINTMENT (OUTPATIENT)
Dept: OBGYN | Facility: CLINIC | Age: 50
End: 2025-08-11
Payer: COMMERCIAL

## 2025-08-11 VITALS — BODY MASS INDEX: 24.56 KG/M2 | WEIGHT: 130 LBS | DIASTOLIC BLOOD PRESSURE: 84 MMHG | SYSTOLIC BLOOD PRESSURE: 140 MMHG

## 2025-08-11 DIAGNOSIS — Z12.39 ENCOUNTER FOR OTHER SCREENING FOR MALIGNANT NEOPLASM OF BREAST: ICD-10-CM

## 2025-08-11 DIAGNOSIS — N76.0 ACUTE VAGINITIS: ICD-10-CM

## 2025-08-11 DIAGNOSIS — N89.8 OTHER SPECIFIED NONINFLAMMATORY DISORDERS OF VAGINA: ICD-10-CM

## 2025-08-11 DIAGNOSIS — N95.2 POSTMENOPAUSAL ATROPHIC VAGINITIS: ICD-10-CM

## 2025-08-11 DIAGNOSIS — Z01.419 ENCOUNTER FOR GYNECOLOGICAL EXAMINATION (GENERAL) (ROUTINE) W/OUT ABNORMAL FINDINGS: ICD-10-CM

## 2025-08-11 LAB
BILIRUB UR QL STRIP: NORMAL
GLUCOSE UR-MCNC: >=1000
HCG UR QL: 0.2 EU/DL
HGB UR QL STRIP.AUTO: NORMAL
KETONES UR-MCNC: NORMAL
LEUKOCYTE ESTERASE UR QL STRIP: NORMAL
NITRITE UR QL STRIP: NORMAL
PH UR STRIP: 5.5
PROT UR STRIP-MCNC: NORMAL
SP GR UR STRIP: 1.01

## 2025-08-11 PROCEDURE — G0463: CPT

## 2025-08-11 PROCEDURE — 81513 NFCT DS BV RNA VAG FLU ALG: CPT

## 2025-08-11 PROCEDURE — G0444 DEPRESSION SCREEN ANNUAL: CPT | Mod: 59

## 2025-08-11 PROCEDURE — 87481 CANDIDA DNA AMP PROBE: CPT

## 2025-08-11 PROCEDURE — 99213 OFFICE O/P EST LOW 20 MIN: CPT | Mod: 25

## 2025-08-11 PROCEDURE — T1013: CPT

## 2025-08-11 PROCEDURE — 87661 TRICHOMONAS VAGINALIS AMPLIF: CPT

## 2025-08-11 PROCEDURE — 87491 CHLMYD TRACH DNA AMP PROBE: CPT

## 2025-08-11 PROCEDURE — 81002 URINALYSIS NONAUTO W/O SCOPE: CPT

## 2025-08-11 PROCEDURE — 87086 URINE CULTURE/COLONY COUNT: CPT

## 2025-08-11 PROCEDURE — 87591 N.GONORRHOEAE DNA AMP PROB: CPT

## 2025-08-12 ENCOUNTER — LABORATORY RESULT (OUTPATIENT)
Age: 50
End: 2025-08-12

## 2025-08-12 DIAGNOSIS — B37.31 ACUTE CANDIDIASIS OF VULVA AND VAGINA: ICD-10-CM

## 2025-08-12 LAB
BV BACTERIA RRNA VAG QL NAA+PROBE: SIGNIFICANT CHANGE UP
C TRACH RRNA SPEC QL NAA+PROBE: SIGNIFICANT CHANGE UP
CULTURE RESULTS: SIGNIFICANT CHANGE UP
N GONORRHOEA RRNA SPEC QL NAA+PROBE: SIGNIFICANT CHANGE UP
SPECIMEN SOURCE: SIGNIFICANT CHANGE UP

## 2025-08-13 PROBLEM — B37.31 CANDIDA VAGINITIS: Status: ACTIVE | Noted: 2025-08-13 | Resolved: 2025-09-12

## 2025-08-13 LAB
C GLABRATA RNA VAG QL NAA+PROBE: SIGNIFICANT CHANGE UP
CANDIDA RRNA VAG QL PROBE: DETECTED
T VAGINALIS RRNA SPEC QL NAA+PROBE: SIGNIFICANT CHANGE UP

## 2025-08-13 RX ORDER — TERCONAZOLE 8 MG/G
0.8 CREAM VAGINAL
Qty: 1 | Refills: 0 | Status: ACTIVE | COMMUNITY
Start: 2025-08-13 | End: 1900-01-01

## 2025-08-13 RX ORDER — FLUCONAZOLE 150 MG/1
150 TABLET ORAL
Qty: 2 | Refills: 0 | Status: ACTIVE | COMMUNITY
Start: 2025-08-13 | End: 1900-01-01

## 2025-08-20 ENCOUNTER — APPOINTMENT (OUTPATIENT)
Dept: INTERNAL MEDICINE | Facility: CLINIC | Age: 50
End: 2025-08-20

## 2025-08-27 ENCOUNTER — OUTPATIENT (OUTPATIENT)
Dept: OUTPATIENT SERVICES | Facility: HOSPITAL | Age: 50
LOS: 1 days | End: 2025-08-27

## 2025-08-27 DIAGNOSIS — I10 ESSENTIAL (PRIMARY) HYPERTENSION: ICD-10-CM
